# Patient Record
Sex: FEMALE | Race: WHITE | NOT HISPANIC OR LATINO | Employment: FULL TIME | URBAN - METROPOLITAN AREA
[De-identification: names, ages, dates, MRNs, and addresses within clinical notes are randomized per-mention and may not be internally consistent; named-entity substitution may affect disease eponyms.]

---

## 2017-04-14 ENCOUNTER — TRANSCRIBE ORDERS (OUTPATIENT)
Dept: ADMINISTRATIVE | Facility: HOSPITAL | Age: 37
End: 2017-04-14

## 2017-04-14 ENCOUNTER — HOSPITAL ENCOUNTER (OUTPATIENT)
Dept: GENERAL RADIOLOGY | Facility: HOSPITAL | Age: 37
Discharge: HOME | End: 2017-04-14
Admitting: NURSE PRACTITIONER

## 2017-04-14 DIAGNOSIS — N20.0 RECURRENT NEPHROLITHIASIS: ICD-10-CM

## 2017-04-14 DIAGNOSIS — N20.0 RECURRENT NEPHROLITHIASIS: Primary | ICD-10-CM

## 2017-04-14 PROCEDURE — 74000 HC ABDOMEN KUB: CPT

## 2017-07-25 ENCOUNTER — HOSPITAL ENCOUNTER (OUTPATIENT)
Dept: CT IMAGING | Facility: HOSPITAL | Age: 37
Discharge: HOME | End: 2017-07-25
Admitting: NURSE PRACTITIONER

## 2017-07-25 ENCOUNTER — TRANSCRIBE ORDERS (OUTPATIENT)
Dept: ADMINISTRATIVE | Facility: HOSPITAL | Age: 37
End: 2017-07-25

## 2017-07-25 DIAGNOSIS — R10.31 RLQ ABDOMINAL PAIN: Primary | ICD-10-CM

## 2017-07-25 DIAGNOSIS — R10.31 RLQ ABDOMINAL PAIN: ICD-10-CM

## 2017-07-25 PROCEDURE — 74176 CT ABD & PELVIS W/O CONTRAST: CPT

## 2017-07-25 PROCEDURE — 0 DIATRIZOATE MEGLUMINE & SODIUM PER 1 ML: Performed by: NURSE PRACTITIONER

## 2017-07-25 RX ADMIN — DIATRIZOATE MEGLUMINE AND DIATRIZOATE SODIUM 15 ML: 660; 100 LIQUID ORAL; RECTAL at 10:30

## 2020-11-30 ENCOUNTER — OFFICE VISIT (OUTPATIENT)
Dept: OBSTETRICS AND GYNECOLOGY | Facility: CLINIC | Age: 40
End: 2020-11-30

## 2020-11-30 VITALS
SYSTOLIC BLOOD PRESSURE: 120 MMHG | WEIGHT: 202.5 LBS | HEIGHT: 67 IN | DIASTOLIC BLOOD PRESSURE: 70 MMHG | BODY MASS INDEX: 31.78 KG/M2

## 2020-11-30 DIAGNOSIS — Z01.419 ENCOUNTER FOR ANNUAL ROUTINE GYNECOLOGICAL EXAMINATION: Primary | ICD-10-CM

## 2020-11-30 DIAGNOSIS — Z12.31 ENCOUNTER FOR SCREENING MAMMOGRAM FOR MALIGNANT NEOPLASM OF BREAST: ICD-10-CM

## 2020-11-30 PROCEDURE — 99395 PREV VISIT EST AGE 18-39: CPT | Performed by: NURSE PRACTITIONER

## 2020-11-30 RX ORDER — NORELGESTROMIN AND ETHINYL ESTRADIOL 150; 35 UG/D; UG/D
1 PATCH TRANSDERMAL WEEKLY
Qty: 3 PATCH | Refills: 12 | Status: SHIPPED | OUTPATIENT
Start: 2020-11-30

## 2020-11-30 RX ORDER — IBUPROFEN 800 MG/1
TABLET ORAL
COMMUNITY
Start: 2020-11-25 | End: 2021-07-06

## 2020-11-30 RX ORDER — NORELGESTROMIN AND ETHINYL ESTRADIOL 150; 35 UG/D; UG/D
PATCH TRANSDERMAL
COMMUNITY
Start: 2020-09-10 | End: 2020-11-30 | Stop reason: SDUPTHER

## 2020-11-30 RX ORDER — CYCLOBENZAPRINE HCL 5 MG
TABLET ORAL
COMMUNITY
Start: 2020-10-15 | End: 2021-08-18

## 2020-11-30 RX ORDER — HYDROCHLOROTHIAZIDE 25 MG/1
25 TABLET ORAL DAILY
COMMUNITY

## 2020-11-30 NOTE — PROGRESS NOTES
GYN Annual Exam     CC - Here for annual exam.        [facility]  Mica Jacobson is a 40 y.o. female, , who presents for annual well woman exam. Patient's last menstrual period was 2020 (within days)..  Periods are regular every 25-35 days, lasting 5 days. .  Dysmenorrhea:none.  Patient reports problems with: none.  There were no changes to her medical or surgical history since her last visit.. Partner Status: Marital Status: .  New Partners since last visit: no.  Desires STD Screening: no.    Additional OB/GYN History   Current contraception: contraceptive methods: xulane  Desires to: continue contraception  Last Pap : 2019  Last Completed Pap Smear       Status Date      PAP SMEAR Done 2019 negative        History of abnormal Pap smear: yes - hx of leep at age 20  Family history of uterine, colon, breast, or ovarian cancer: no  Performs monthly Self-Breast Exam: yes  Exercises Regularly:no  Feelings of Anxiety or Depression: no  Tobacco Usage?: No   OB History        1    Para   1    Term   1            AB        Living   1       SAB        TAB        Ectopic        Molar        Multiple        Live Births                    Health Maintenance   Topic Date Due   • Annual Gynecologic Pelvic and Breast Exam  1980   • ANNUAL PHYSICAL  1984   • TDAP/TD VACCINES (1 - Tdap) 2000   • INFLUENZA VACCINE  2020   • HEPATITIS C SCREENING  10/26/2020   • PAP SMEAR  2022   • Pneumococcal Vaccine 0-64  Aged Out       The additional following portions of the patient's history were reviewed and updated as appropriate: allergies, current medications, past family history, past medical history, past social history, past surgical history and problem list.    Review of Systems   Constitutional: Negative.    HENT: Negative.    Eyes: Negative.    Respiratory: Negative.    Cardiovascular: Negative.    Gastrointestinal: Negative.    Endocrine: Negative.   "  Genitourinary: Negative.    Musculoskeletal: Negative.    Skin: Negative.    Allergic/Immunologic: Negative.    Neurological: Negative.    Hematological: Negative.    Psychiatric/Behavioral: Negative.      All other systems reviewed and are negative.     I have reviewed and agree with the HPI, ROS, and historical information as entered above. Lynnette Sterling, APRN    Objective   /70   Ht 170.2 cm (67\")   Wt 91.9 kg (202 lb 8 oz)   LMP 11/04/2020 (Within Days)   Breastfeeding No   BMI 31.72 kg/m²     Physical Exam  Vitals signs and nursing note reviewed. Exam conducted with a chaperone present.   Constitutional:       Appearance: She is well-developed.   HENT:      Head: Normocephalic and atraumatic.   Neck:      Musculoskeletal: Normal range of motion. No muscular tenderness.      Thyroid: No thyroid mass or thyromegaly.   Cardiovascular:      Rate and Rhythm: Normal rate and regular rhythm.      Heart sounds: No murmur.   Pulmonary:      Effort: Pulmonary effort is normal. No retractions.      Breath sounds: Normal breath sounds. No wheezing, rhonchi or rales.   Chest:      Chest wall: No mass or tenderness.      Breasts:         Right: Normal. No mass, nipple discharge, skin change or tenderness.         Left: Normal. No mass, nipple discharge, skin change or tenderness.   Abdominal:      Palpations: Abdomen is soft. Abdomen is not rigid. There is no mass.      Tenderness: There is no abdominal tenderness. There is no guarding.      Hernia: No hernia is present. There is no hernia in the left inguinal area.   Genitourinary:     Labia:         Right: No rash, tenderness or lesion.         Left: No rash, tenderness or lesion.       Vagina: Normal. No vaginal discharge or lesions.      Cervix: No cervical motion tenderness, discharge, lesion or cervical bleeding.      Uterus: Normal. Not enlarged, not fixed and not tender.       Adnexa:         Right: No mass or tenderness.          Left: No mass or " tenderness.        Rectum: No external hemorrhoid.   Neurological:      Mental Status: She is alert and oriented to person, place, and time.   Psychiatric:         Behavior: Behavior normal.            Assessment and Plan    Problem List Items Addressed This Visit     None      Visit Diagnoses     Encounter for annual routine gynecological examination    -  Primary    Relevant Orders    Pap IG, HPV-hr          1. GYN annual well woman exam.   2. Reviewed risks/benefits of hormonal contraception after age 35, including possible increased risk of breast cancer, heart disease, blood clots and strokes.  Patient strongly desires to stay on hormonal contraception.  3. Reviewed monthly self breast exams.  Instructed to call with lumps, pain, or breast discharge.    4. RTC in 1 year or PRN with problems   5. Discussed xulane with increased risk of VTE, she VU and desires to continue.   6. She will schedule screening mammogram. Ordered today      Lynnette Sterling, APRN  11/30/2020

## 2020-12-03 DIAGNOSIS — Z01.419 ENCOUNTER FOR ANNUAL ROUTINE GYNECOLOGICAL EXAMINATION: ICD-10-CM

## 2021-06-09 ENCOUNTER — TRANSCRIBE ORDERS (OUTPATIENT)
Dept: ADMINISTRATIVE | Facility: HOSPITAL | Age: 41
End: 2021-06-09

## 2021-06-09 DIAGNOSIS — M51.36 DDD (DEGENERATIVE DISC DISEASE), LUMBAR: Primary | ICD-10-CM

## 2021-06-28 ENCOUNTER — HOSPITAL ENCOUNTER (OUTPATIENT)
Dept: MRI IMAGING | Facility: HOSPITAL | Age: 41
Discharge: HOME | End: 2021-06-28
Admitting: STUDENT IN AN ORGANIZED HEALTH CARE EDUCATION/TRAINING PROGRAM

## 2021-06-28 DIAGNOSIS — M51.36 DDD (DEGENERATIVE DISC DISEASE), LUMBAR: ICD-10-CM

## 2021-06-28 PROCEDURE — 72148 MRI LUMBAR SPINE W/O DYE: CPT

## 2021-07-06 RX ORDER — IBUPROFEN 800 MG/1
TABLET ORAL
Qty: 90 TABLET | Refills: 0 | Status: ON HOLD | OUTPATIENT
Start: 2021-07-06 | End: 2022-03-03 | Stop reason: SDUPTHER

## 2021-08-18 ENCOUNTER — OFFICE VISIT (OUTPATIENT)
Dept: NEUROSURGERY | Facility: CLINIC | Age: 41
End: 2021-08-18

## 2021-08-18 VITALS — HEIGHT: 66 IN | TEMPERATURE: 97.7 F | WEIGHT: 200 LBS | BODY MASS INDEX: 32.14 KG/M2

## 2021-08-18 DIAGNOSIS — M54.9 MECHANICAL BACK PAIN: ICD-10-CM

## 2021-08-18 DIAGNOSIS — M48.062 SPINAL STENOSIS OF LUMBAR REGION WITH NEUROGENIC CLAUDICATION: Primary | ICD-10-CM

## 2021-08-18 PROCEDURE — 99204 OFFICE O/P NEW MOD 45 MIN: CPT | Performed by: NEUROLOGICAL SURGERY

## 2021-08-18 RX ORDER — GABAPENTIN 300 MG/1
CAPSULE ORAL
Qty: 90 CAPSULE | Refills: 0 | Status: SHIPPED | OUTPATIENT
Start: 2021-08-18 | End: 2021-10-14

## 2021-08-18 RX ORDER — CARISOPRODOL 250 MG/1
1 TABLET ORAL NIGHTLY PRN
COMMUNITY
End: 2021-09-07

## 2021-08-18 NOTE — PROGRESS NOTES
Patient: Mica Jacobson  : 1980    Primary Care Provider: Brisa Griffith MD    Requesting Provider: As above        History    Chief Complaint: Low back and bilateral lower extremity pain.    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  On 2012 she underwent L4-S1 TLIF to address a discogenic pain syndrome.  She has done absolutely great.  However, for the last 6 months she has had increasing back and bilateral lower extremity symptoms.  These bother her when she is on her feet or with protracted sitting.  She even struggles at night when changing positions.  She has been treated with Soma.  She has no bowel or bladder dysfunction.    Review of Systems   Constitutional: Negative for activity change, appetite change, chills, diaphoresis, fatigue, fever and unexpected weight change.   HENT: Negative for congestion, dental problem, drooling, ear discharge, ear pain, facial swelling, hearing loss, mouth sores, nosebleeds, postnasal drip, rhinorrhea, sinus pressure, sinus pain, sneezing, sore throat, tinnitus, trouble swallowing and voice change.    Eyes: Negative for photophobia, pain, discharge, redness, itching and visual disturbance.   Respiratory: Negative for apnea, cough, choking, chest tightness, shortness of breath, wheezing and stridor.    Cardiovascular: Negative for chest pain, palpitations and leg swelling.   Gastrointestinal: Negative for abdominal distention, abdominal pain, anal bleeding, blood in stool, constipation, diarrhea, nausea, rectal pain and vomiting.   Endocrine: Negative for cold intolerance, heat intolerance, polydipsia, polyphagia and polyuria.   Genitourinary: Negative for decreased urine volume, difficulty urinating, dysuria, enuresis, flank pain, frequency, genital sores, hematuria and urgency.   Musculoskeletal: Positive for back pain. Negative for arthralgias, gait problem, joint  "swelling, myalgias, neck pain and neck stiffness.   Skin: Negative for color change, pallor, rash and wound.   Allergic/Immunologic: Negative for environmental allergies, food allergies and immunocompromised state.   Neurological: Positive for weakness. Negative for dizziness, tremors, seizures, syncope, facial asymmetry, speech difficulty, light-headedness, numbness and headaches.   Hematological: Negative for adenopathy. Does not bruise/bleed easily.   Psychiatric/Behavioral: Positive for sleep disturbance. Negative for agitation, behavioral problems, confusion, decreased concentration, dysphoric mood, hallucinations, self-injury and suicidal ideas. The patient is not nervous/anxious and is not hyperactive.    All other systems reviewed and are negative.      The patient's past medical history, past surgical history, family history, and social history have been reviewed at length in the electronic medical record.    Physical Exam:   Temp 97.7 °F (36.5 °C)   Ht 167.6 cm (65.98\")   Wt 90.7 kg (200 lb)   BMI 32.30 kg/m²   CONSTITUTIONAL: Patient is well-nourished, pleasant and appears stated age.  CV: Heart regular rate and rhythm without murmur, rub, or gallop.  PULMONARY: Lungs are clear to ascultation.  MUSCULOSKELETAL:  Straight leg raising is negative.  Reggie's Sign is negative.  ROM in the low back is normal.  Tenderness in the back to palpation is not observed.  NEUROLOGICAL:  Orientation, memory, attention span, language function, and cognition have been examined and are intact.  Strength is intact in the lower extremities to direct testing.  Muscle tone is normal throughout.  Station and gait are normal.  Sensation is intact to light touch testing throughout.  Deep tendon reflexes are 1+ and symmetrical.  Coordination is intact.      Medical Decision Making    Data Review:   (All imaging studies were personally reviewed unless stated otherwise)  MRI of the lumbar spine dated 6/20/2021 demonstrates " changes related to her prior L4-S1 decompression fusion.  There is generous stenosis at L3-4 above her previous fusion site.  The L3-4 disc appears radiographically normal.    Diagnosis:   1.  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  2.  Mechanical back pain.    Treatment Options:   I have referred the patient for physical therapy and have started her on gabapentin.  Prior to follow-up in several weeks I am going to check plain flexion-extension films as well as a CT of the lumbar spine to assess the integrity of her prior fusion construct.  We might consider injections if she is not doing better.       Diagnosis Plan   1. Spinal stenosis of lumbar region with neurogenic claudication  gabapentin (NEURONTIN) 300 MG capsule    Ambulatory Referral to Physical Therapy Evaluate and treat; Stretching, ROM, Strengthening   2. Mechanical back pain         Scribed for Ronald Cantu MD by TIAGO Sullivan 8/18/2021 17:05 EDT      I, Dr. Cantu, personally performed the services described in the documentation, as scribed in my presence, and it is both accurate and complete.

## 2021-09-01 ENCOUNTER — HOSPITAL ENCOUNTER (OUTPATIENT)
Dept: CT IMAGING | Facility: HOSPITAL | Age: 41
Discharge: HOME | End: 2021-09-01
Admitting: NEUROLOGICAL SURGERY

## 2021-09-01 ENCOUNTER — APPOINTMENT (OUTPATIENT)
Dept: GENERAL RADIOLOGY | Facility: HOSPITAL | Age: 41
End: 2021-09-01

## 2021-09-01 DIAGNOSIS — M48.062 SPINAL STENOSIS OF LUMBAR REGION WITH NEUROGENIC CLAUDICATION: ICD-10-CM

## 2021-09-01 PROCEDURE — 72131 CT LUMBAR SPINE W/O DYE: CPT

## 2021-09-07 ENCOUNTER — OFFICE VISIT (OUTPATIENT)
Dept: NEUROSURGERY | Facility: CLINIC | Age: 41
End: 2021-09-07

## 2021-09-07 VITALS — TEMPERATURE: 97.5 F | HEIGHT: 66 IN | BODY MASS INDEX: 32.14 KG/M2 | WEIGHT: 200 LBS

## 2021-09-07 DIAGNOSIS — M48.062 SPINAL STENOSIS OF LUMBAR REGION WITH NEUROGENIC CLAUDICATION: ICD-10-CM

## 2021-09-07 DIAGNOSIS — M54.9 MECHANICAL BACK PAIN: Primary | ICD-10-CM

## 2021-09-07 PROCEDURE — 99213 OFFICE O/P EST LOW 20 MIN: CPT | Performed by: NEUROLOGICAL SURGERY

## 2021-09-07 NOTE — PROGRESS NOTES
Patient: Mica Jacobson  : 1980    Primary Care Provider: Brisa Griffith MD    Requesting Provider: As above        History    Chief Complaint: Low back and bilateral lower extremity pain.    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  On 2012 she underwent L4-S1 TLIF to address a discogenic pain syndrome.  She has done absolutely great.  However, for the last 6 months she has had increasing back and bilateral lower extremity symptoms.  These bother her when she is on her feet or with protracted sitting.  She even struggles at night when changing positions.  She has been treated with Soma.  She has no bowel or bladder dysfunction.  Gabapentin has made her a bit groggy but may be helping modestly.  She did one physical therapy session and more was not approved by sherie.    Review of Systems   Constitutional: Negative for activity change, appetite change, chills, diaphoresis, fatigue, fever and unexpected weight change.   HENT: Negative for congestion, dental problem, drooling, ear discharge, ear pain, facial swelling, hearing loss, mouth sores, nosebleeds, postnasal drip, rhinorrhea, sinus pressure, sinus pain, sneezing, sore throat, tinnitus, trouble swallowing and voice change.    Eyes: Negative for photophobia, pain, discharge, redness, itching and visual disturbance.   Respiratory: Negative for apnea, cough, choking, chest tightness, shortness of breath, wheezing and stridor.    Cardiovascular: Negative for chest pain, palpitations and leg swelling.   Gastrointestinal: Negative for abdominal distention, abdominal pain, anal bleeding, blood in stool, constipation, diarrhea, nausea, rectal pain and vomiting.   Endocrine: Negative for cold intolerance, heat intolerance, polydipsia, polyphagia and polyuria.   Genitourinary: Negative for decreased urine volume, difficulty urinating, dysuria, enuresis, flank pain,  "frequency, genital sores, hematuria and urgency.   Musculoskeletal: Positive for back pain. Negative for arthralgias, gait problem, joint swelling, myalgias, neck pain and neck stiffness.   Skin: Negative for color change, pallor, rash and wound.   Allergic/Immunologic: Negative for environmental allergies, food allergies and immunocompromised state.   Neurological: Positive for weakness. Negative for dizziness, tremors, seizures, syncope, facial asymmetry, speech difficulty, light-headedness, numbness and headaches.   Hematological: Negative for adenopathy. Does not bruise/bleed easily.   Psychiatric/Behavioral: Positive for sleep disturbance. Negative for agitation, behavioral problems, confusion, decreased concentration, dysphoric mood, hallucinations, self-injury and suicidal ideas. The patient is not nervous/anxious and is not hyperactive.    All other systems reviewed and are negative.      The patient's past medical history, past surgical history, family history, and social history have been reviewed at length in the electronic medical record.    Physical Exam:   Temp 97.5 °F (36.4 °C)   Ht 167.6 cm (65.98\")   Wt 90.7 kg (200 lb)   BMI 32.30 kg/m²   MUSCULOSKELETAL:  Straight leg raising is negative.  Reggie's Sign is negative.  Tenderness in the back to palpation is not observed.  NEUROLOGICAL:  Strength is intact in the lower extremities to direct testing.  Muscle tone is normal throughout.  Station and gait are normal.  Sensation is intact to light touch testing throughout.      Medical Decision Making    Data Review:   (All imaging studies were personally reviewed unless stated otherwise)  MRI of the lumbar spine dated 6/20/2021 demonstrates changes related to her prior L4-S1 decompression fusion.  There is generous stenosis at L3-4 above her previous fusion site.  The L3-4 disc appears radiographically normal.    CT of the lumbar spine demonstrates solid fusion at L4-5 and L5-S1.    Flexion-extension " "films do not reveal evidence of instability above her previous fusion.  The radiologist commented on retrolisthesis of L2 on L3 and L3 on L4 with \"mild motion present.\"  I would disagree completely.    Diagnosis:   1.  Mechanical low back pain.  2.  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    Treatment Options:   The patient is going to give the gabapentin another couple of weeks.  If she is not tolerating the medicine then she will discontinue that.  I am going to refer her to another physical therapy setting.  I am not sure what her previous therapist at [facility] was trying to do or bill for.  I am also going to refer her to one of my pain colleagues for some injections.  She will follow-up thereafter.       Diagnosis Plan   1. Mechanical back pain     2. Spinal stenosis of lumbar region with neurogenic claudication  Ambulatory Referral to Pain Management    Ambulatory Referral to Physical Therapy Evaluate and treat; Stretching, ROM, Strengthening       Scribed for Ronald Cantu MD by TIAGO Sullivan 9/7/2021 15:05 EDT      I, Dr. Cantu, personally performed the services described in the documentation, as scribed in my presence, and it is both accurate and complete.  "

## 2021-09-08 ENCOUNTER — TELEPHONE (OUTPATIENT)
Dept: NEUROSURGERY | Facility: CLINIC | Age: 41
End: 2021-09-08

## 2021-09-08 NOTE — TELEPHONE ENCOUNTER
Patient should discuss pain management with PCP.  We do not prescribe narcotics etc. without patient being on schedule.    Patient is already taking gabapentin and ibuprofen.  Patient could try Tylenol in between for breakthrough pain

## 2021-09-08 NOTE — TELEPHONE ENCOUNTER
Provider:  Pepe  Caller: patient  Time of call:   10:59  Phone #: [phone]   Surgery:    Surgery Date:    Last visit:   yesterday  Next visit:     SIENNA:     06/08/2021 Carisoprodol 250MG 1980 15 15 ANITA DOMÍNGUEZ [facility] [address] [place] [address] 1  06/30/2021 Carisoprodol 250MG 1980 15 15 GIOVANNY BELLO [facility] [address] [place] [address] 1  08/18/2021 Gabapentin 300MG 1980 90 33 PEPE MEEK [facility] [address] [place] [address]    Reason for call:     Patient called and asked if she could have something for pain until she gets in to see Dr. Owusu?

## 2021-09-28 ENCOUNTER — TELEPHONE (OUTPATIENT)
Dept: PAIN MEDICINE | Facility: CLINIC | Age: 41
End: 2021-09-28

## 2021-09-28 NOTE — TELEPHONE ENCOUNTER
CIERRAM for pt regarding pain medication for her back. Advised that it is against our policy for Dr. Owusu to prescribe anything for pain. Advise pt to call back in any further questions.

## 2021-09-28 NOTE — TELEPHONE ENCOUNTER
Caller: VERONIKA KRISHNA    Relationship to patient: SELF    Best call back number:     Patient is needing: THE PATIENT IS CALLING TO SEE IF DR SUTHERLAND WOULD BE ABLE TO PRESCRIBE HER ANY PAIN MEDICATION FOR HER BACK TO HOLD HER OVER UNTIL HER NEW PATIENT APPOINTMENT ON 10/14/21. THE PATIENT STATED SHE HAD ASKED DR FERNÁNDEZ AND HIS OFFICE TOLD HER THEY WERE UNABLE TO PRESCRIBE HER ANY MEDICATION.

## 2021-10-04 NOTE — PROGRESS NOTES
"Chief Complaint: \"Pain in my lower back and legs\"        History of Present Illness:   Patient: Ms. Mica Jacobson, 41 y.o. female   Referring Physician: Ronald Cantu MD   Reason for Referral: Consultation for chronic intractable lower back and lower extremity pain.   Pain History: Patient reports a history of L4-S1 posterior lumbar interbody fusion on 08/27/2012 with excellent outcome.  Unfortunately, for the past 6-9 months, she has been experiencing increasing lower back pain and lower extremity pain associated with significant neurogenic claudication. She also experiences pain in her feet with protracted sitting.  Sometimes during the night she struggles with pain when changing positions.  Patient participated in physical therapy for a limited number of sessions due to lack of approval by her insurance (only allows 6 visits).  She experienced some relief from gabapentin but had to stop it due to side effects (drowsiness). MRI of the lumbar spine on 06/20/2021 revealed successful decompression and fusion L4-S1. Moderate to severe canal and foraminal stenosis at L3-L4, at the adjacent level of her lumbar fusion.  CT of the lumbar spine confirm a solid fusion L4-S1 without evidence of hardware complication.  Flexion and extension x-rays of the lumbar spine reviewed the solid fusion.  No evidence of significant instability. Patient has failed to obtain pain relief with conservative measures including oral analgesics, physical therapy, to name a few. Mica Jacobson underwent neurosurgical consultation with Dr. Ronald Cantu on 9/7/2021, and was found not to be a surgical candidate at this time.  Pain has progressed in intensity over the past several months.   Pain Description: Constant lower back pain with intermittent exacerbation, described as aching, burning, shooting and throbbing sensation.   Radiation of Pain: The pain radiates from the lumbar region into her buttocks, the medial aspect of her " thighs, and down the lateral aspect of her thighs, calves, and into the lateral aspect of her feet  Pain intensity today: 4/10  Average pain intensity last week: 5/10  Pain intensity ranges from: 4/10 to 9/10  Aggravating factors: Pain increases with bending, twisting, protracted sitting, standing, walking. Patient describes neurogenic claudication.    Alleviating factors: Pain decreases with lying down   Associated Symptoms:   Patient reports pain and weakness (claudication) in the lower extremities. Patient denies numbness  Patient denies any new bladder or bowel problems  Patient reports difficulties with her balance but denies falls  Pain interferes with her sleep causing sleep fragmentation    Review of previous therapies and additional medical records:  Mica Jacobson has already failed the following measures, including:   Conservative Measures: Oral analgesics, opioids, topical analgesics, ice, heat, TENs, physical therapy   Interventional Measures: None  Surgical Measures: History of L4-S1 posterior lumbar interbody fusion by Dr. Ronald Cantu in 8/2012   Mica Jacobson underwent neurosurgical consultation with Dr. Ronald Cantu on 9/7/2021, and was found not to be a surgical candidate at this time.  Mica Jacobson presents with significant comorbidities including GERD, kidney stones, mild obesity, current every day smoker  In terms of current analgesics, Mica Jacobson takes: ibuprofen, Flexeril, Norco  I have reviewed [facility] Report #[id number] (gabapentin) consistent with medication reconciliation.  SOAPP: Not completed by the patient    Global Pain Scale 10-14  2021          Pain 15          Feelings 5          Clinical outcomes 18          Activities 5          GPS Total: 43            Review of Diagnostic Studies:  I have reviewed the images with the patient as well as the reports, as follows;  CT scan of the lumbar spine without contrast on 09/03/2021.  Evidence of prior L4-S1  posterior lumbar fusion without evidence of complication of the hardware.  Alignment is anatomic.  Multilevel spondylosis particularly at the adjacent level of L3-L4.  MRI of the lumbar spine without contrast on 06/29/2021.  Vertebral body heights are maintained.  Prior fusion L4 L5-S1.  Alignment is preserved.  The conus medullaris and cauda equina image unremarkably.  Axial imaging:  L2-L3: Facet arthropathy.  No significant canal or foraminal stenosis  L3-L4: Disc bulge, facet arthropathy with moderate to severe spinal canal stenosis and moderate bilateral neuroforaminal stenosis  L4-L5 and L5-S1: S/p previous decompression and fusion without evidence of canal or foraminal stenosis  Flexion and extension x-rays of the lumbar spine on September 3, 2021.  Grade 1 listhesis of L2 on L3 and L3-L4 without abnormal motion.  Fusion hardware seen from L4-S1    Review of Systems   Constitutional: Positive for fatigue.   Musculoskeletal: Positive for back pain.   Neurological: Positive for weakness.   All other systems reviewed and are negative.        There is no problem list on file for this patient.      Past Medical History:   Diagnosis Date   • Abnormal Pap smear of cervix    • Back problem    • Breast cyst age 16    one breast is smaller   • Calculus of kidney h/o 2010 2016-passed 1 and still have 2-started QD water pill. She passed stone 11/25/19-brought in stone   • GERD (gastroesophageal reflux disease)    • Hx of degenerative disc disease    • Ovarian cyst     ruptured 2013, small present on CT 7/2016   • Papanicolaou smear 11/19/2018    reviewed 11/25/2019-negative   • Screening breast examination     self admits        Past Surgical History:   Procedure Laterality Date   • CARPAL TUNNEL RELEASE Bilateral 2018   • CHOLECYSTECTOMY     • COLPOSCOPY     • KIDNEY STONE SURGERY  2010   • OTHER SURGICAL HISTORY  age 20    laser of cervix   • SPINAL FUSION  08/27/2012    DDD   • WISDOM TOOTH EXTRACTION      2017     "     Family History   Problem Relation Age of Onset   • Heart disease Father         heart attacks   • Hypertension Father    • Diabetes Brother    • Hypertension Brother    • Diabetes Maternal Grandmother    • Diabetes Maternal Grandfather          Social History     Socioeconomic History   • Marital status:    Tobacco Use   • Smoking status: Current Every Day Smoker     Packs/day: 0.50     Types: Cigarettes     Last attempt to quit:      Years since quittin.7   • Smokeless tobacco: Never Used   • Tobacco comment: Currently trying to quit, 2021   Vaping Use   • Vaping Use: Never used   Substance and Sexual Activity   • Alcohol use: Yes     Comment: Rare, 1 or 2 drinks per week   • Drug use: Never   • Sexual activity: Defer     Birth control/protection: Patch           Current Outpatient Medications:   •  cyclobenzaprine (FLEXERIL) 5 MG tablet, Take 5 mg by mouth 3 (Three) Times a Day As Needed for Muscle Spasms., Disp: , Rfl:   •  hydroCHLOROthiazide (HYDRODIURIL) 25 MG tablet, Take 25 mg by mouth Daily., Disp: , Rfl:   •  HYDROcodone-acetaminophen (Norco) 5-325 MG per tablet, , Disp: , Rfl:   •  ibuprofen (ADVIL,MOTRIN) 800 MG tablet, TAKE ONE TABLET BY MOUTH THREE TIMES A DAY WITH FOOD FOR 30 DAYS (Patient taking differently: Take 800 mg by mouth 2 (two) times a day.), Disp: 90 tablet, Rfl: 0  •  Xulane 150-35 MCG/24HR, Place 1 patch on the skin as directed by provider 1 (One) Time Per Week., Disp: 3 patch, Rfl: 12      Allergies   Allergen Reactions   • Shellfish-Derived Products Anaphylaxis and Hives   • Tizanidine Other (See Comments)     Pt states severe dizziness, drowsiness, weakness, severe dry mouth, headache, nausea          /76 (BP Location: Left arm, Patient Position: Sitting, Cuff Size: Adult)   Pulse 94   Temp 95.5 °F (35.3 °C)   Ht 170.2 cm (67\")   Wt 92.8 kg (204 lb 9.6 oz)   SpO2 98%   BMI 32.04 kg/m²       Physical Exam:  Constitutional: Patient appears " well-developed, well-nourished, well-hydrated, appears younger than stated age  HEENT: Head: Normocephalic and atraumatic  Eyes: Conjunctivae and lids are normal  Pupils: Equal, round, reactive to light  Neck: Trachea normal. Neck supple. No JVD present.   Peripheral vascular exam: Posterior tibialis: right 2+ and left 2+. Dorsalis pedis: right 2+ and left 2+. No edema.   Musculoskeletal   Gait and station: Gait evaluation demonstrated a normal gait. Able to walk on heels, toes, tandem walking   Lumbar spine: Passive and active range of motion are limited secondary to pain. Extension, flexion, lateral flexion, rotation of the lumbar spine increased and reproduced pain. Lumbar facet joint loading maneuvers are positive.  Reggie test and Gaenslen's test are negative   Piriformis maneuvers are negative   Palpation of the bilateral greater trochanter, unrevealing   Examination of the Iliotibial band: unrevealing   Hip joints: The range of motion of the hip joints is almost full and without pain   Neurological:   Patient is alert and oriented to person, place, and time.   Speech: Normal.   Cortical function: Normal mental status.   Cranial nerves 2-12: intact.   Reflex Scores:  Right patellar: 2+  Left patellar: 2+  Right Achilles: 1+  Left Achilles: 1+  Motor strength: 5/5  Motor Tone: Normal .   Involuntary movements: None.   Superficial/Primitive Reflexes: Primitive reflexes were absent.   Right Camarena: Absent  Left Camarena: Absent  Right ankle clonus: Absent  Left ankle clonus: Absent   Babinsky: Absent  Long tract signs: Negative. Straight leg raising test: Negative. Femoral stretch sign: Negative.   Sensory exam: Intact to light touch, intact pain and temperature sensation, intact vibration sensation and normal proprioception.   Coordination: Normal finger to nose and heel to shin. Normal balance and negative Romberg's sign   Skin and subcutaneous tissue: Skin is warm and intact. No rash noted. No cyanosis.    Psychiatric: Judgment and insight: Normal. Recent and remote memory: Intact. Mood and affect: Normal.     ASSESSMENT:   1. Lumbar stenosis with neurogenic claudication    2. History of fusion of lumbar spine    3. Mild obesity    4. Current every day smoker    5. Encounter for smoking cessation counseling        PLAN/MEDICAL DECISION MAKING:  Patient reports a history of L4-S1 posterior lumbar interbody fusion on 08/27/2012 with excellent outcome. Unfortunately, for the past 6-9 months, she started experiencing increasing lower back pain and bilateral lower extremity pain associated with neurogenic claudication. She also struggles with pain when changing positions during the night. Patient has participated in physical therapy without significant improvement.  She experienced some pain relief from gabapentin but had to stop it due to side effects (drowsiness). MRI of the lumbar spine on 06/20/2021 revealed successful decompression and fusion L4-S1. Moderate to severe canal and foraminal stenosis at L3-L4, at the adjacent level of her lumbar fusion.  CT of the lumbar spine confirm a solid fusion L4-S1 without evidence of hardware complication.  Flexion and extension x-rays of the lumbar spine reviewed the solid fusion. No evidence of significant instability. Patient has failed to obtain pain relief with conservative measures including oral analgesics, physical therapy, to name a few. Mica Jacobson underwent neurosurgical consultation with Dr. Ronald Cantu on 9/7/2021, and was found not to be a surgical candidate at this time.  Pain has progressed in intensity over the past several months. I have reviewed all available patient's medical records as well as previous therapies as referenced above. I had a lengthy conversation with Ms. Mica Jacobson regarding her chronic pain condition and potential therapeutic options including risks, benefits, alternative therapies, to name a few. Therefore, I have  proposed the following plan:  1. Pharmacological measures: Reviewed and discussed; Patient takes ibuprofen, Flexeril, Norco. Patient has declined additional pharmacological measures. We could consider a trial with a low dose of Lyrica or Cymbalta  2. Interventional pain management measures: Patient will be scheduled for diagnostic and therapeutic bilateral L3-L4 transforaminal epidural steroid injections. We may repeat epidurals depending on patient's outcome.  We could also consider the possibility of diagnostic and therapeutic bilateral lumbar facet joint injections at L3-4 as she also presents with a significant mechanical component of her chronic pain.  Patient will follow-up with Dr. Cantu thereafter.  3. Long-term rehabilitation efforts:  A. The patient does not have a history of falls. I did complete a risk assessment for falls  B. Continue a comprehensive physical therapy program for core strengthening, gait and balance training and neurodynamics   C. Start an exercise program such as water therapy and swimming  D. Contrast therapy: Apply ice-packs for 15-20 minutes, followed by heating pads for 15-20 minutes to affected area   E. Patient's Body mass index is 32 kg/m². We discussed portion control and increasing exercise. Patient counseled on the importance of weight loss to help with overall health and pain control. Patient instructed to attempt weight loss.    F. Patient has been screened for tobacco use: Current tobacco user. Smoking Cessation: I have advised the patient at length regarding the long-term deleterious effects of smoking and have provided the patient strategies to facilitate smoking cessation. I spent approximately 4 minutes advising the patient. In addition, I have prescribed Nicotine patches 7 mg one patch daily for 4 weeks, then, discontinue. Patient has been instructed to cut down or stop as he starts nicotine replacement therapy  4. The patient has been instructed to contact my office  with any questions or difficulties. The patient understands the plan and agrees to proceed accordingly.      Patient Care Team:  Brisa Griffith MD as PCP - General (General Practice)     No orders of the defined types were placed in this encounter.        Future Appointments   Date Time Provider Department Center   12/1/2021  1:30 PM Storm Lopez MD [facility]         Ben Owusu MD     EMR Dragon/Transcription disclaimer:  Much of this encounter note is an electronic transcription of spoken language to printed text. Electronic transcription of spoken language may permit erroneous, or at times, nonsensical words or phrases to be inadvertently transcribed. Although I have reviewed the note for such errors, some may still exist.

## 2021-10-14 ENCOUNTER — OFFICE VISIT (OUTPATIENT)
Dept: PAIN MEDICINE | Facility: CLINIC | Age: 41
End: 2021-10-14

## 2021-10-14 VITALS
WEIGHT: 204.6 LBS | DIASTOLIC BLOOD PRESSURE: 76 MMHG | HEIGHT: 67 IN | HEART RATE: 94 BPM | SYSTOLIC BLOOD PRESSURE: 118 MMHG | OXYGEN SATURATION: 98 % | BODY MASS INDEX: 32.11 KG/M2 | TEMPERATURE: 95.5 F

## 2021-10-14 DIAGNOSIS — Z71.6 ENCOUNTER FOR SMOKING CESSATION COUNSELING: ICD-10-CM

## 2021-10-14 DIAGNOSIS — M48.062 LUMBAR STENOSIS WITH NEUROGENIC CLAUDICATION: ICD-10-CM

## 2021-10-14 DIAGNOSIS — F17.200 CURRENT EVERY DAY SMOKER: ICD-10-CM

## 2021-10-14 DIAGNOSIS — F17.200 CURRENT EVERY DAY SMOKER: Primary | ICD-10-CM

## 2021-10-14 DIAGNOSIS — E66.9 MILD OBESITY: ICD-10-CM

## 2021-10-14 DIAGNOSIS — Z98.1 HISTORY OF FUSION OF LUMBAR SPINE: ICD-10-CM

## 2021-10-14 PROCEDURE — 99406 BEHAV CHNG SMOKING 3-10 MIN: CPT | Performed by: ANESTHESIOLOGY

## 2021-10-14 PROCEDURE — 99204 OFFICE O/P NEW MOD 45 MIN: CPT | Performed by: ANESTHESIOLOGY

## 2021-10-14 RX ORDER — HYDROCODONE BITARTRATE AND ACETAMINOPHEN 5; 325 MG/1; MG/1
TABLET ORAL
COMMUNITY
Start: 2021-10-05 | End: 2022-01-04

## 2021-10-14 RX ORDER — CYCLOBENZAPRINE HCL 5 MG
5 TABLET ORAL EVERY 8 HOURS PRN
COMMUNITY

## 2021-10-15 ENCOUNTER — PATIENT ROUNDING (BHMG ONLY) (OUTPATIENT)
Dept: PAIN MEDICINE | Facility: CLINIC | Age: 41
End: 2021-10-15

## 2021-10-15 NOTE — PROGRESS NOTES
October 15, 2021    Hello, may I speak with Mica Jacobson?    My name is TEJA     I am  with [facility]  [address]  [address]  [facility] [address] [phone].    Before we get started may I verify your date of birth? 1980    I am calling to officially welcome you to our practice and ask about your recent visit. Is this a good time to talk? YES    Tell me about your visit with us. What things went well?  IT ALL WENT WELL       We're always looking for ways to make our patients' experiences even better. Do you have recommendations on ways we may improve? NOT THAT I CAN RECALL     Overall were you satisfied with your first visit to our practice?YEAH,  IT WAS GOOD       I appreciate you taking the time to speak with me today. Is there anything else I can do for you? NO, IM DOING OKAY THANKS      Thank you, and have a great day.

## 2021-11-01 ENCOUNTER — OUTSIDE FACILITY SERVICE (OUTPATIENT)
Dept: PAIN MEDICINE | Facility: CLINIC | Age: 41
End: 2021-11-01

## 2021-11-01 PROCEDURE — 64483 NJX AA&/STRD TFRM EPI L/S 1: CPT | Performed by: ANESTHESIOLOGY

## 2021-11-02 ENCOUNTER — TELEPHONE (OUTPATIENT)
Dept: PAIN MEDICINE | Facility: CLINIC | Age: 41
End: 2021-11-02

## 2021-11-02 NOTE — TELEPHONE ENCOUNTER
Spoke with pt regarding yesterday’s procedure with Dr. Owusu. Pt stated they are doing well, with a little bit of pain down her back, and into her buttocks/groin and legs.I advised to alternate tylenol/ibupprofen, and use contrast therapy.  Advised of f/u appointment. Pt understood. Reminder card in the mail.

## 2021-11-03 ENCOUNTER — LAB (OUTPATIENT)
Dept: LAB | Facility: HOSPITAL | Age: 41
End: 2021-11-03

## 2021-11-03 ENCOUNTER — TRANSCRIBE ORDERS (OUTPATIENT)
Dept: LAB | Facility: HOSPITAL | Age: 41
End: 2021-11-03

## 2021-11-03 DIAGNOSIS — Z01.818 OTHER SPECIFIED PRE-OPERATIVE EXAMINATION: Primary | ICD-10-CM

## 2021-11-03 DIAGNOSIS — Z01.818 OTHER SPECIFIED PRE-OPERATIVE EXAMINATION: ICD-10-CM

## 2021-11-03 LAB
ANION GAP SERPL CALCULATED.3IONS-SCNC: 8.7 MMOL/L (ref 5–15)
BUN SERPL-MCNC: 12 MG/DL (ref 6–20)
BUN/CREAT SERPL: 16.4 (ref 7–25)
CALCIUM SPEC-SCNC: 9.9 MG/DL (ref 8.6–10.5)
CHLORIDE SERPL-SCNC: 101 MMOL/L (ref 98–107)
CO2 SERPL-SCNC: 26.3 MMOL/L (ref 22–29)
CREAT SERPL-MCNC: 0.73 MG/DL (ref 0.57–1)
DEPRECATED RDW RBC AUTO: 38.1 FL (ref 37–54)
ERYTHROCYTE [DISTWIDTH] IN BLOOD BY AUTOMATED COUNT: 12.1 % (ref 12.3–15.4)
GFR SERPL CREATININE-BSD FRML MDRD: 88 ML/MIN/1.73
GLUCOSE SERPL-MCNC: 85 MG/DL (ref 65–99)
HBA1C MFR BLD: 5.2 % (ref 4.8–5.6)
HCT VFR BLD AUTO: 40.3 % (ref 34–46.6)
HGB BLD-MCNC: 13.8 G/DL (ref 12–15.9)
MCH RBC QN AUTO: 30.2 PG (ref 26.6–33)
MCHC RBC AUTO-ENTMCNC: 34.2 G/DL (ref 31.5–35.7)
MCV RBC AUTO: 88.2 FL (ref 79–97)
PLATELET # BLD AUTO: 210 10*3/MM3 (ref 140–450)
PMV BLD AUTO: 11.8 FL (ref 6–12)
POTASSIUM SERPL-SCNC: 4.7 MMOL/L (ref 3.5–5.2)
RBC # BLD AUTO: 4.57 10*6/MM3 (ref 3.77–5.28)
SODIUM SERPL-SCNC: 136 MMOL/L (ref 136–145)
WBC # BLD AUTO: 9.17 10*3/MM3 (ref 3.4–10.8)

## 2021-11-03 PROCEDURE — 83036 HEMOGLOBIN GLYCOSYLATED A1C: CPT

## 2021-11-03 PROCEDURE — 36415 COLL VENOUS BLD VENIPUNCTURE: CPT

## 2021-11-03 PROCEDURE — 85027 COMPLETE CBC AUTOMATED: CPT

## 2021-11-03 PROCEDURE — 80048 BASIC METABOLIC PNL TOTAL CA: CPT

## 2021-11-14 PROCEDURE — U0004 COV-19 TEST NON-CDC HGH THRU: HCPCS | Performed by: FAMILY MEDICINE

## 2021-11-17 RX ORDER — OXYCODONE HYDROCHLORIDE 5 MG/1
5 TABLET ORAL EVERY 4 HOURS PRN
Qty: 25 TABLET | Refills: 0 | Status: ON HOLD | OUTPATIENT
Start: 2021-11-17 | End: 2022-02-28

## 2021-11-29 ENCOUNTER — TELEPHONE (OUTPATIENT)
Dept: PAIN MEDICINE | Facility: CLINIC | Age: 41
End: 2021-11-29

## 2021-11-29 RX ORDER — DULOXETIN HYDROCHLORIDE 20 MG/1
20 CAPSULE, DELAYED RELEASE ORAL DAILY
Qty: 30 CAPSULE | Refills: 0 | Status: ON HOLD | OUTPATIENT
Start: 2021-11-29 | End: 2022-02-28

## 2021-11-29 NOTE — TELEPHONE ENCOUNTER
Provider: DR. Owusu  Caller: VERONIKA HILL  Relationship to Patient: SELF    Phone Number: [phone]  Reason for Call: NOT ANY BETTER FROM INJ AND HAS FOLL UP 12/9/2021 - WAS TOLD TO ADVISE IF ANY BETTER OR WORSE FROM THE INJ - REQ CALL BACK FOR CLINICAL ADVICE OF WHAT CAN DO BEFORE COMING IN 12/9/2021

## 2021-11-29 NOTE — TELEPHONE ENCOUNTER
Spoke with pt who stated that she is having excruciating pain when she sits, and is unable to sit for more than a few minutes. Pt states that standing/walking is okay, and laying down is okay it just hurts when she sits. Pt stated that she is doing physical therapy and she hasn't had an relief. Pt stated that from her injection to now the pain has increased. She stated that she had tennis elbow surgery a week and half ago so she is home resting. Pt stated that tylenol/ibuprofen doesn't help. Pt also stated that heat and ice isn't working either. I advised that I would send a message to Dr. Owusu to see what he recommends.       Spoke with pt and advised that Dr. Owusu sent a rx for duloxetine. Also advised that when she sees Daiana 12/9/21 they can discuss moving forward with injections, or other. Pt understood.

## 2021-11-30 ENCOUNTER — TELEPHONE (OUTPATIENT)
Dept: PAIN MEDICINE | Facility: CLINIC | Age: 41
End: 2021-11-30

## 2021-11-30 NOTE — TELEPHONE ENCOUNTER
Provider: DR. SUTHERLAND    Caller: PATIENT      Relationship to Patient: SELF    Pharmacy: [facility]- [phone]    Phone Number:  [phone]    Reason for Call:  PT. STATES THAT DR. SUTHERLAND STARTED HER YESTERDAY ON DULOXETINE -SHE IS HAVING SIDE EFFECTS- SEVERE HEADACHE, SEVERE NAUSEA, DIZZY SPELLS.   WOULD LIKE TO KNOW WHAT DR. SUTHERLAND ADVISES

## 2021-12-08 NOTE — PROGRESS NOTES
"Chief Complaint: \"I still have pain in my back and legs\"        History of Present Illness:   Patient: Ms. Mica Jacobson, 41 y.o. female originally referred by Dr. Cantu in consultation for chronic intractable lower back and lower extremity pain.  Patient has a history of L4-S1 posterior lumbar interbody fusion on 8/27/2012 with excellent outcome with Dr. Ronald Cnatu.  Unfortunately, over the past 6 to 9 months she has been experiencing increasing lower back and lower extremity pain with intolerance to standing and walking.  We last saw her on November 1, 2021, when she underwent diagnostic and therapeutic bilateral L3-L4 transforaminal epidural steroid injections, from which she reports experiencing no significant reduction in her pain or pain levels.  She has been participating in physical therapy without significant improvement.  She was prescribed nicotine patches for smoking cessation.  She called the office several weeks after her injection, reporting an increase in her pain and no relief, she was started on a trial of Cymbalta 20 mg, from which she experienced significant side effects and discontinued medication.  Unfortunately, she continues to harbor significant symptoms of lower back and leg pain.  Her symptoms remain unchanged from previous visit.  She returns today for post procedure follow-up and evaluation.  Pain Description: Constant pain with intermittent exacerbation, described as aching, burning, shooting and throbbing sensation.   Radiation of Pain: The pain radiates from the lumbar region into her buttocks, the medial aspect of her thighs, and down the lateral aspect of her thighs, calves, and into the lateral aspect of her feet  Pain intensity today: 6/10  Average pain intensity last week: 5/10  Pain intensity ranges from: 3/10 to 10/10  Aggravating factors: Pain increases with bending, twisting, protracted sitting, standing, walking.     Alleviating factors: Pain decreases with lying " down   Associated Symptoms:   Patient reports pain and weakness in the lower extremities. Patient denies numbness  Patient denies any new bladder or bowel problems  Patient reports difficulties with her balance but denies falls    Review of previous therapies and additional medical records:  Mica Jacobson has already failed the following measures, including:   Conservative Measures: Oral analgesics, opioids, topical analgesics, ice, heat, TENs, physical therapy   Interventional Measures: 11/01/2021: DxTx  bilateral L3-L4 transforaminal epidural steroid injections  Surgical Measures: History of L4-S1 posterior lumbar interbody fusion by Dr. Ronald Cantu in 8/2012   Mica Jacobson underwent neurosurgical consultation with Dr. Ronald Cantu on 9/7/2021, and was found not to be a surgical candidate at this time.  Mica Jacobson presents with significant comorbidities including GERD, kidney stones, mild obesity, current every day smoker  In terms of current analgesics, Mica Jacobson takes: ibuprofen, Flexeril, Norco.  She has failed trials with gabapentin and Cymbalta due to side effects.  I have reviewed Agapito Report is consistent with medication reconciliation.  SOAPP: Not completed by the patient    Global Pain Scale 10-14  2021 12-09 2021         Pain 15 15         Feelings 5 0         Clinical outcomes 18 20         Activities 5 8         GPS Total: 43 43           Review of Diagnostic Studies:   CT scan of the lumbar spine without contrast on 09/03/2021: Imaging was reviewed.  Evidence of prior L4-S1 posterior lumbar fusion without evidence of complication of the hardware.  Alignment is anatomic.  Multilevel spondylosis particularly at the adjacent level of L3-L4.  MRI of the lumbar spine without contrast on 06/29/2021: Imaging was reviewed.  Vertebral body heights are maintained.  Prior fusion L4 L5-S1.  Alignment is maintained.   L2-L3: Facet arthropathy.  No significant canal or  foraminal stenosis  L3-L4: Disc bulge, facet arthropathy with moderate to severe spinal canal stenosis and moderate bilateral neuroforaminal stenosis  L4-L5 and L5-S1: S/p previous decompression and fusion without evidence of canal or foraminal stenosis  Flexion and extension x-rays of the lumbar spine on September 3, 2021.  Grade 1 listhesis of L2 on L3 and L3-L4 without abnormal motion.  Fusion hardware seen from L4-S1    Review of Systems   Musculoskeletal: Positive for back pain.   All other systems reviewed and are negative.        There is no problem list on file for this patient.      Past Medical History:   Diagnosis Date   • Abnormal Pap smear of cervix    • Back problem    • Breast cyst age 16    one breast is smaller   • Calculus of kidney h/o 2010-passed 1 and still have 2-started QD water pill. She passed stone 20-brought in stone   • GERD (gastroesophageal reflux disease)    • Hx of degenerative disc disease    • Ovarian cyst     ruptured , small present on CT 2016   • Papanicolaou smear 2018    reviewed 2019-negative   • Screening breast examination     self admits        Past Surgical History:   Procedure Laterality Date   • CARPAL TUNNEL RELEASE Bilateral    • CHOLECYSTECTOMY     • COLPOSCOPY     • KIDNEY STONE SURGERY     • OTHER SURGICAL HISTORY  age 20    laser of cervix   • SPINAL FUSION  2012    DDD   • TENNIS ELBOW RELEASE  2021   • WISDOM TOOTH EXTRACTION               Family History   Problem Relation Age of Onset   • Heart disease Father         heart attacks   • Hypertension Father    • Diabetes Brother    • Hypertension Brother    • Diabetes Maternal Grandmother    • Diabetes Maternal Grandfather          Social History     Socioeconomic History   • Marital status:    Tobacco Use   • Smoking status: Current Every Day Smoker     Packs/day: 0.50     Types: Cigarettes     Last attempt to quit:      Years since quittin.9  "  • Smokeless tobacco: Never Used   • Tobacco comment: Currently trying to quit, 09/07/2021   Vaping Use   • Vaping Use: Never used   Substance and Sexual Activity   • Alcohol use: Yes     Comment: Rare, 1 or 2 drinks per week   • Drug use: Never   • Sexual activity: Defer     Birth control/protection: Patch           Current Outpatient Medications:   •  hydroCHLOROthiazide (HYDRODIURIL) 25 MG tablet, Take 25 mg by mouth Daily., Disp: , Rfl:   •  HYDROcodone-acetaminophen (Norco) 5-325 MG per tablet, , Disp: , Rfl:   •  ibuprofen (ADVIL,MOTRIN) 800 MG tablet, TAKE ONE TABLET BY MOUTH THREE TIMES A DAY WITH FOOD FOR 30 DAYS (Patient taking differently: Take 800 mg by mouth 2 (two) times a day.), Disp: 90 tablet, Rfl: 0  •  nicotine (NICODERM CQ) 7 MG/24HR patch, Place 1 patch on the skin as directed by provider Daily., Disp: 28 patch, Rfl: 0  •  oxyCODONE (ROXICODONE) 5 MG immediate release tablet, Take 1 tablet by mouth Every 4 (Four) Hours As Needed for Moderate Pain ., Disp: 25 tablet, Rfl: 0  •  Xulane 150-35 MCG/24HR, Place 1 patch on the skin as directed by provider 1 (One) Time Per Week., Disp: 3 patch, Rfl: 12  •  cyclobenzaprine (FLEXERIL) 5 MG tablet, Take 5 mg by mouth 3 (Three) Times a Day As Needed for Muscle Spasms., Disp: , Rfl:   •  DULoxetine (CYMBALTA) 20 MG capsule, Take 1 capsule by mouth Daily., Disp: 30 capsule, Rfl: 0      Allergies   Allergen Reactions   • Shellfish-Derived Products Anaphylaxis and Hives   • Tizanidine Other (See Comments)     Pt states severe dizziness, drowsiness, weakness, severe dry mouth, headache, nausea          Pulse 81   Temp 97.9 °F (36.6 °C) (Infrared)   Resp 14   Ht 170.2 cm (67\")   Wt 93.2 kg (205 lb 6.4 oz)   SpO2 98%   BMI 32.17 kg/m²       Physical Exam:  Constitutional: Patient appears well-developed, well-nourished, well-hydrated, appears younger than stated age  HEENT: Head: Normocephalic and atraumatic  Eyes: Conjunctivae and lids are normal  Pupils: " Equal, round, reactive to light  Neck: Trachea normal. Neck supple. No JVD present.   Peripheral vascular exam: Posterior tibialis: right 2+ and left 2+. Dorsalis pedis: right 2+ and left 2+. No edema.   Musculoskeletal   Gait and station: Gait evaluation demonstrated a normal gait.   Lumbar spine: Passive and active range of motion are limited secondary to pain. Extension, flexion, lateral flexion, rotation of the lumbar spine increased and reproduced pain. Lumbar facet joint loading maneuvers are positive.  Reggie test and Gaenslen's test are negative   Piriformis maneuvers are negative   Palpation of the bilateral greater trochanter, unrevealing   Examination of the Iliotibial band: unrevealing   Hip joints: The range of motion of the hip joints is almost full and without pain   Neurological:   Patient is alert and oriented to person, place, and time.   Speech: Normal.   Cortical function: Normal mental status.   Cranial nerves 2-12: intact.   Reflex Scores:  Right patellar: 2+  Left patellar: 2+  Right Achilles: 1+  Left Achilles: 1+  Motor strength: 5/5  Motor Tone: Normal .   Involuntary movements: None.   Superficial/Primitive Reflexes: Primitive reflexes were absent.   Right Camarena: Absent  Left Camarena: Absent  Right ankle clonus: Absent  Left ankle clonus: Absent   Babinsky: Absent  Long tract signs: Negative. Straight leg raising test: Negative. Femoral stretch sign: Negative.   Sensory exam: Intact to light touch, intact pain and temperature sensation, intact vibration sensation and normal proprioception.   Coordination: Normal finger to nose and heel to shin. Normal balance and negative Romberg's sign   Skin and subcutaneous tissue: Skin is warm and intact. No rash noted. No cyanosis.   Psychiatric: Judgment and insight: Normal. Recent and remote memory: Intact. Mood and affect: Normal.     I have reviewed the physical exam dated 10/14/2021. Upon examination of the patient today, there are no changes  noted.      ASSESSMENT:   1. Lumbar stenosis with neurogenic claudication    2. History of fusion of lumbar spine    3. Spondylosis of lumbar region without myelopathy or radiculopathy    4. Current every day smoker    5. Mild obesity        PLAN/MEDICAL DECISION MAKING:  Patient has a history of L4-S1 posterior lumbar interbody fusion on 8/27/2012 with excellent outcome with Dr. Ronald Cantu.  Unfortunately, over the past 6 to 9 months she has been experiencing increasing lower back and lower extremity pain with intolerance to standing and walking. Patient has continued to participate in physical therapy without significant improvement.  MRI of the lumbar spine on 06/20/2021 revealed successful decompression and fusion L4-S1. Moderate to severe canal and foraminal stenosis at L3-L4, at the adjacent level of her lumbar fusion.  CT of the lumbar spine confirm a solid fusion L4-S1 without evidence of hardware complication.  Flexion and extension x-rays of the lumbar spine reviewed the solid fusion. No evidence of significant instability. Patient has failed to obtain pain relief with conservative measures including oral analgesics, physical therapy, to name a few.  Unfortunately, she has now failed trials with Cymbalta and gabapentin due to side effects.  She continues to harbor a significant amount of lower back and leg pain.  Unfortunately, she reported no reduction of her symptoms with transforaminal epidural, therefore the likelihood of a another epidural to improve her symptoms will not be beneficial.  She is going to follow-up with Dr. Cantu.  I have reviewed all available patient's medical records as well as previous therapies as referenced above. I had a lengthy conversation with Ms. Mica Jacobson regarding her chronic pain condition and potential therapeutic options including risks, benefits, alternative therapies, to name a few. Therefore, I have proposed the following plan:  1. Pharmacological  measures: Reviewed and discussed; Patient takes ibuprofen, Flexeril, Norco. Patient has declined additional pharmacological measures.   2. Interventional pain management measures: None indicated this time.  Patient is going to follow-up with Dr. Cantu.  3. Long-term rehabilitation efforts:  A. The patient does not have a history of falls. I did complete a risk assessment for falls  B.  Patient will continue a comprehensive physical therapy program for core strengthening, gait and balance training and neurodynamics   C. Start an exercise program such as water therapy and swimming  D. Contrast therapy: Apply ice-packs for 15-20 minutes, followed by heating pads for 15-20 minutes to affected area   E. Patient's Body mass index is 32 kg/m². We discussed portion control and increasing exercise. Patient counseled on the importance of weight loss to help with overall health and pain control. Patient instructed to attempt weight loss.    4. The patient has been instructed to contact my office with any questions or difficulties. The patient understands the plan and agrees to proceed accordingly.      Patient Care Team:  Brisa Griffith MD as PCP - General (General Practice)     No orders of the defined types were placed in this encounter.        Future Appointments   Date Time Provider Department Center   1/4/2022  3:20 PM Ronald Cantu MD [facility]/Transcription disclaimer:  Much of this encounter note is an electronic transcription of spoken language to printed text. Electronic transcription of spoken language may permit erroneous, or at times, nonsensical words or phrases to be inadvertently transcribed. Although I have reviewed the note for such errors, some may still exist.

## 2021-12-09 ENCOUNTER — OFFICE VISIT (OUTPATIENT)
Dept: PAIN MEDICINE | Facility: CLINIC | Age: 41
End: 2021-12-09

## 2021-12-09 VITALS
HEART RATE: 81 BPM | HEIGHT: 67 IN | OXYGEN SATURATION: 98 % | TEMPERATURE: 97.9 F | BODY MASS INDEX: 32.24 KG/M2 | WEIGHT: 205.4 LBS | RESPIRATION RATE: 14 BRPM

## 2021-12-09 DIAGNOSIS — E66.9 MILD OBESITY: ICD-10-CM

## 2021-12-09 DIAGNOSIS — M47.816 SPONDYLOSIS OF LUMBAR REGION WITHOUT MYELOPATHY OR RADICULOPATHY: ICD-10-CM

## 2021-12-09 DIAGNOSIS — Z98.1 HISTORY OF FUSION OF LUMBAR SPINE: ICD-10-CM

## 2021-12-09 DIAGNOSIS — F17.200 CURRENT EVERY DAY SMOKER: ICD-10-CM

## 2021-12-09 DIAGNOSIS — M48.062 LUMBAR STENOSIS WITH NEUROGENIC CLAUDICATION: ICD-10-CM

## 2021-12-09 PROCEDURE — 99212 OFFICE O/P EST SF 10 MIN: CPT | Performed by: NURSE PRACTITIONER

## 2021-12-21 ENCOUNTER — TELEPHONE (OUTPATIENT)
Dept: PAIN MEDICINE | Facility: CLINIC | Age: 41
End: 2021-12-21

## 2021-12-21 NOTE — TELEPHONE ENCOUNTER
Hub staff attempted to follow warm transfer process and was unsuccessful     Caller: VERONIKA KRISHNA    Relationship to patient: SELF    Best call back number: [phone]    Patient is needing: WANTED TO SEE IF THERE WAS SOMETHING DR SUTHERLAND COULD PRESCRIBE TO HELP WITH HER BACK PAIN, SHE'S HAD INJECTIONS AND IS SEEING DR FERNÁNDEZ IN TWO WEEKS BUT NOTHING SEEMS TO BE HELPING, INCLUDING HER CURRENT MEDICATIONS. PLEASE CALL HER BACK AT THE NUMBER ABOVE.

## 2021-12-22 NOTE — TELEPHONE ENCOUNTER
Spoke with pt and advised that Dr. Owusu recommends tylenol/ibuprofen and that she may want to reach out to [facility], or if the symptoms worsen to go to the ER or [facility]. Pt stated they see Dr. Menon 1/4/22 and will see what he says. Pt understood the above and no further needs expressed.

## 2021-12-23 ENCOUNTER — TELEPHONE (OUTPATIENT)
Dept: NEUROSURGERY | Facility: CLINIC | Age: 41
End: 2021-12-23

## 2021-12-23 NOTE — TELEPHONE ENCOUNTER
I called and spoke with patient and gave her the message per Monica. She said ok and was thankful for the call back.

## 2021-12-23 NOTE — TELEPHONE ENCOUNTER
Provider:  Pepe  Caller: patient  Time of call: 10:54    Phone #:  [phone]  Surgery:  L4-S1 TLIF  Surgery Date:  08/27/12  Last visit:   09/07/21  Next visit: 01/04/22    SIENNA:    06/08/2021 Carisoprodol 250MG 1980 15 15 ANITA DOMÍNGUEZ [facility] [address] [facility] 1  06/30/2021 Carisoprodol 250MG 1980 15 15 GIOVANNY BELLO [facility] [address] [place] [address] 1  08/18/2021 Gabapentin 300MG 1980 90 33 PEPE MEEK [facility] [address] [place] [address] 1  10/05/2021 Hydrocodone Bitartrate/Ac 325MG/5MG 1980 20 10 CRISTIN NASIR [facility] [address] [place] [address] 10 1  11/17/2021 Oxycodone Hydrochloride 5MG 1980 25 4 BECKA LAST [facility] [address] [place] [address] 47 1     Reason for call:     Patient called and asked if she could have something for pain until her apt with Dr. Cantu.    She said she was seeing Dr. Owusu, however injections are not working anymore.  She complains of low back pain and buttock pain.  She has pain that radiates down the sides of her lower extremities to her ankles. She is no longer taking Cymbalta. (She only took one dose.)  She has tried heat and ice with no relief.  She alternates Motrin and Tylenol.    Patient states Roxicodone was given to her last month as she had arm surgery.

## 2022-01-04 ENCOUNTER — OFFICE VISIT (OUTPATIENT)
Dept: NEUROSURGERY | Facility: CLINIC | Age: 42
End: 2022-01-04

## 2022-01-04 ENCOUNTER — PREP FOR SURGERY (OUTPATIENT)
Dept: OTHER | Facility: HOSPITAL | Age: 42
End: 2022-01-04

## 2022-01-04 VITALS — HEIGHT: 67 IN | WEIGHT: 205.4 LBS | TEMPERATURE: 98.4 F | BODY MASS INDEX: 32.24 KG/M2

## 2022-01-04 DIAGNOSIS — M48.062 LUMBAR STENOSIS WITH NEUROGENIC CLAUDICATION: Primary | ICD-10-CM

## 2022-01-04 DIAGNOSIS — M48.062 SPINAL STENOSIS OF LUMBAR REGION WITH NEUROGENIC CLAUDICATION: ICD-10-CM

## 2022-01-04 DIAGNOSIS — M54.9 MECHANICAL BACK PAIN: Primary | ICD-10-CM

## 2022-01-04 PROBLEM — N20.0 KIDNEY STONE: Status: ACTIVE | Noted: 2017-12-29

## 2022-01-04 PROCEDURE — 99213 OFFICE O/P EST LOW 20 MIN: CPT | Performed by: NEUROLOGICAL SURGERY

## 2022-01-04 RX ORDER — OXYCODONE HCL 10 MG/1
10 TABLET, FILM COATED, EXTENDED RELEASE ORAL ONCE
Status: CANCELLED | OUTPATIENT
Start: 2022-01-04 | End: 2022-01-04

## 2022-01-04 RX ORDER — IBUPROFEN 800 MG/1
800 TABLET ORAL ONCE
Status: CANCELLED | OUTPATIENT
Start: 2022-01-04 | End: 2022-01-04

## 2022-01-04 RX ORDER — ACETAMINOPHEN 500 MG
1000 TABLET ORAL ONCE
Status: CANCELLED | OUTPATIENT
Start: 2022-01-04 | End: 2022-01-04

## 2022-01-04 RX ORDER — CEFAZOLIN SODIUM 2 G/100ML
2 INJECTION, SOLUTION INTRAVENOUS ONCE
Status: CANCELLED | OUTPATIENT
Start: 2022-01-04 | End: 2022-01-04

## 2022-01-04 RX ORDER — FAMOTIDINE 20 MG/1
20 TABLET, FILM COATED ORAL
Status: CANCELLED | OUTPATIENT
Start: 2022-01-04

## 2022-01-04 NOTE — PROGRESS NOTES
Patient: Mica Jacobson  : 1980    Primary Care Provider: Brisa Griffith MD    Requesting Provider: As above        History    Chief Complaint: Low back and bilateral lower extremity pain.    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  On 2012 she underwent L4-S1 TLIF to address a discogenic pain syndrome.  She has done absolutely great.  However, for the last 10 months she has had increasing back and bilateral lower extremity symptoms.  These bother her when she is on her feet or with protracted sitting.  She even struggles at night when changing positions.  She has been treated with Soma.  She has no bowel or bladder dysfunction.  Gabapentin has made her a bit groggy but may be helping modestly.  Studies have demonstrated a transition syndrome with stenosis at L3-4 above her prior construct.  She has done more therapy to no avail.  An epidural injection was not helpful.  She is struggling.    Review of Systems   Constitutional: Negative for activity change, appetite change, chills, diaphoresis, fatigue, fever and unexpected weight change.   HENT: Negative for congestion, dental problem, drooling, ear discharge, ear pain, facial swelling, hearing loss, mouth sores, nosebleeds, postnasal drip, rhinorrhea, sinus pressure, sinus pain, sneezing, sore throat, tinnitus, trouble swallowing and voice change.    Eyes: Negative for photophobia, pain, discharge, redness, itching and visual disturbance.   Respiratory: Negative for apnea, cough, choking, chest tightness, shortness of breath, wheezing and stridor.    Cardiovascular: Negative for chest pain, palpitations and leg swelling.   Gastrointestinal: Negative for abdominal distention, abdominal pain, anal bleeding, blood in stool, constipation, diarrhea, nausea, rectal pain and vomiting.   Endocrine: Negative for cold intolerance, heat intolerance, polydipsia, polyphagia  "and polyuria.   Genitourinary: Negative for decreased urine volume, difficulty urinating, dysuria, enuresis, flank pain, frequency, genital sores, hematuria and urgency.   Musculoskeletal: Positive for back pain. Negative for gait problem, joint swelling, myalgias, neck pain and neck stiffness.   Skin: Negative for color change, pallor, rash and wound.   Allergic/Immunologic: Negative for environmental allergies, food allergies and immunocompromised state.   Neurological: Positive for numbness. Negative for dizziness, tremors, seizures, syncope, facial asymmetry, speech difficulty, weakness, light-headedness and headaches.   Hematological: Negative for adenopathy. Does not bruise/bleed easily.   Psychiatric/Behavioral: Negative for agitation, behavioral problems, confusion, decreased concentration, dysphoric mood, hallucinations, self-injury, sleep disturbance and suicidal ideas. The patient is not nervous/anxious and is not hyperactive.        The patient's past medical history, past surgical history, family history, and social history have been reviewed at length in the electronic medical record.    Physical Exam:   Temp 98.4 °F (36.9 °C)   Ht 170.2 cm (67\")   Wt 93.2 kg (205 lb 6.4 oz)   BMI 32.17 kg/m²   MUSCULOSKELETAL:  Straight leg raising is negative.  Reggie's Sign is negative.  Tenderness in the back to palpation is not observed.  NEUROLOGICAL:  Strength is intact in the lower extremities to direct testing.  Muscle tone is normal throughout.  Station and gait are normal.  Sensation is intact to light touch testing throughout.    Medical Decision Making    Data Review:   (All imaging studies were personally reviewed unless stated otherwise)  MRI of the lumbar spine dated 6/20/2021 demonstrates changes related to her prior L4-S1 decompression fusion.  There is generous stenosis at L3-4 above her previous fusion site.  The L3-4 disc appears radiographically normal.     CT of the lumbar spine demonstrates " "solid fusion at L4-5 and L5-S1.     Flexion-extension films do not reveal evidence of instability above her previous fusion.  The radiologist commented on retrolisthesis of L2 on L3 and L3 on L4 with \"mild motion present.\"  I would disagree completely.    Diagnosis:   1.  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  2.  Mechanical low back pain.    Treatment Options:   Patient is struggling.  She has exhausted other measures.  At this point I have recommended additional decompression with fusion extension to the L3-4 level.  We should be able to remove old hardware below those levels.  The nature of the procedure as well as the potential risks, complications, limitations, and alternatives to the procedure were discussed at length with the patient and the patient has agreed to proceed with surgery.       Diagnosis Plan   1. Mechanical back pain     2. Spinal stenosis of lumbar region with neurogenic claudication         Scribed for Ronald Cantu MD by Michelle Boyd CMA on 1/4/2022 16:17 EST       I, Dr. Cantu, personally performed the services described in the documentation, as scribed in my presence, and it is both accurate and complete.  "

## 2022-01-04 NOTE — H&P
Patient: Mica Jacobson  : 1980     Primary Care Provider: Brisa Griffith MD     Requesting Provider: As above           History     Chief Complaint: Low back and bilateral lower extremity pain.     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  On 2012 she underwent L4-S1 TLIF to address a discogenic pain syndrome.  She has done absolutely great.  However, for the last 10 months she has had increasing back and bilateral lower extremity symptoms.  These bother her when she is on her feet or with protracted sitting.  She even struggles at night when changing positions.  She has been treated with Soma.  She has no bowel or bladder dysfunction.  Gabapentin has made her a bit groggy but may be helping modestly.  Studies have demonstrated a transition syndrome with stenosis at L3-4 above her prior construct.  She has done more therapy to no avail.  An epidural injection was not helpful.  She is struggling.     Review of Systems   Constitutional: Negative for activity change, appetite change, chills, diaphoresis, fatigue, fever and unexpected weight change.   HENT: Negative for congestion, dental problem, drooling, ear discharge, ear pain, facial swelling, hearing loss, mouth sores, nosebleeds, postnasal drip, rhinorrhea, sinus pressure, sinus pain, sneezing, sore throat, tinnitus, trouble swallowing and voice change.    Eyes: Negative for photophobia, pain, discharge, redness, itching and visual disturbance.   Respiratory: Negative for apnea, cough, choking, chest tightness, shortness of breath, wheezing and stridor.    Cardiovascular: Negative for chest pain, palpitations and leg swelling.   Gastrointestinal: Negative for abdominal distention, abdominal pain, anal bleeding, blood in stool, constipation, diarrhea, nausea, rectal pain and vomiting.   Endocrine: Negative for cold intolerance, heat intolerance, polydipsia,  polyphagia and polyuria.   Genitourinary: Negative for decreased urine volume, difficulty urinating, dysuria, enuresis, flank pain, frequency, genital sores, hematuria and urgency.   Musculoskeletal: Positive for back pain. Negative for gait problem, joint swelling, myalgias, neck pain and neck stiffness.   Skin: Negative for color change, pallor, rash and wound.   Allergic/Immunologic: Negative for environmental allergies, food allergies and immunocompromised state.   Neurological: Positive for numbness. Negative for dizziness, tremors, seizures, syncope, facial asymmetry, speech difficulty, weakness, light-headedness and headaches.   Hematological: Negative for adenopathy. Does not bruise/bleed easily.   Psychiatric/Behavioral: Negative for agitation, behavioral problems, confusion, decreased concentration, dysphoric mood, hallucinations, self-injury, sleep disturbance and suicidal ideas. The patient is not nervous/anxious and is not hyperactive.          The patient's past medical history, past surgical history, family history, and social history have been reviewed at length in the electronic medical record.     Past Medical History:   Diagnosis Date   • Abnormal Pap smear of cervix    • Back problem    • Breast cyst age 16    one breast is smaller   • Calculus of kidney h/o 2010 2016-passed 1 and still have 2-started QD water pill. She passed stone 11/25/19-brought in stone   • GERD (gastroesophageal reflux disease)    • Hx of degenerative disc disease    • Ovarian cyst     ruptured 2013, small present on CT 7/2016   • Papanicolaou smear 11/19/2018    reviewed 11/25/2019-negative   • Screening breast examination     self admits     Past Surgical History:   Procedure Laterality Date   • CARPAL TUNNEL RELEASE Bilateral 2018   • CHOLECYSTECTOMY     • COLPOSCOPY     • KIDNEY STONE SURGERY  2010   • OTHER SURGICAL HISTORY  age 20    laser of cervix   • SPINAL FUSION  08/27/2012    DDD   • TENNIS ELBOW RELEASE   2021   • WISDOM TOOTH EXTRACTION      2017     Family History   Problem Relation Age of Onset   • Heart disease Father         heart attacks   • Hypertension Father    • Diabetes Brother    • Hypertension Brother    • Diabetes Maternal Grandmother    • Diabetes Maternal Grandfather      Social History     Socioeconomic History   • Marital status:    Tobacco Use   • Smoking status: Current Every Day Smoker     Packs/day: 0.50     Types: Cigarettes     Last attempt to quit:      Years since quittin.0   • Smokeless tobacco: Never Used   • Tobacco comment: Currently trying to quit, 2021   Vaping Use   • Vaping Use: Never used   Substance and Sexual Activity   • Alcohol use: Yes     Comment: Rare, 1 or 2 drinks per week   • Drug use: Never   • Sexual activity: Defer     Birth control/protection: Patch       Allergies   Allergen Reactions   • Shellfish-Derived Products Anaphylaxis and Hives   • Tizanidine Other (See Comments)     Pt states severe dizziness, drowsiness, weakness, severe dry mouth, headache, nausea    • Cymbalta [Duloxetine Hcl] Dizziness     Dry mouth weakness, headache, nausea, and drowiness       Current Outpatient Medications on File Prior to Visit   Medication Sig Dispense Refill   • cyclobenzaprine (FLEXERIL) 5 MG tablet Take 5 mg by mouth 3 (Three) Times a Day As Needed for Muscle Spasms.     • DULoxetine (CYMBALTA) 20 MG capsule Take 1 capsule by mouth Daily. 30 capsule 0   • hydroCHLOROthiazide (HYDRODIURIL) 25 MG tablet Take 25 mg by mouth Daily.     • ibuprofen (ADVIL,MOTRIN) 800 MG tablet TAKE ONE TABLET BY MOUTH THREE TIMES A DAY WITH FOOD FOR 30 DAYS (Patient taking differently: Take 800 mg by mouth 2 (two) times a day.) 90 tablet 0   • nicotine (NICODERM CQ) 7 MG/24HR patch Place 1 patch on the skin as directed by provider Daily. 28 patch 0   • oxyCODONE (ROXICODONE) 5 MG immediate release tablet Take 1 tablet by mouth Every 4 (Four) Hours As Needed for Moderate  "Pain . 25 tablet 0   • Xulane 150-35 MCG/24HR Place 1 patch on the skin as directed by provider 1 (One) Time Per Week. 3 patch 12   • [DISCONTINUED] HYDROcodone-acetaminophen (Norco) 5-325 MG per tablet        No current facility-administered medications on file prior to visit.        Physical Exam:   Temp 98.4 °F (36.9 °C)   Ht 170.2 cm (67\")   Wt 93.2 kg (205 lb 6.4 oz)   BMI 32.17 kg/m²   MUSCULOSKELETAL:  Straight leg raising is negative.  Reggie's Sign is negative.  Tenderness in the back to palpation is not observed.  NEUROLOGICAL:  Strength is intact in the lower extremities to direct testing.  Muscle tone is normal throughout.  Station and gait are normal.  Sensation is intact to light touch testing throughout.     Medical Decision Making     Data Review:   (All imaging studies were personally reviewed unless stated otherwise)  MRI of the lumbar spine dated 6/20/2021 demonstrates changes related to her prior L4-S1 decompression fusion.  There is generous stenosis at L3-4 above her previous fusion site.  The L3-4 disc appears radiographically normal.     CT of the lumbar spine demonstrates solid fusion at L4-5 and L5-S1.     Flexion-extension films do not reveal evidence of instability above her previous fusion.  The radiologist commented on retrolisthesis of L2 on L3 and L3 on L4 with \"mild motion present.\"  I would disagree completely.     Diagnosis:   1.  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  2.  Mechanical low back pain.     Treatment Options:   Patient is struggling.  She has exhausted other measures.  At this point I have recommended additional decompression with fusion extension to the L3-4 level.  We should be able to remove old hardware below those levels.  The nature of the procedure as well as the potential risks, complications, limitations, and alternatives to the procedure were discussed at length with the patient and the patient has agreed to proceed with surgery.          " Diagnosis Plan   1. Mechanical back pain      2. Spinal stenosis of lumbar region with neurogenic claudication

## 2022-01-24 ENCOUNTER — TELEPHONE (OUTPATIENT)
Dept: NEUROSURGERY | Facility: CLINIC | Age: 42
End: 2022-01-24

## 2022-01-24 NOTE — TELEPHONE ENCOUNTER
I explained to the patient that the hospital still has covid restrictions in place, and I'll call her as soon we can schedule her fusion surgery.

## 2022-01-24 NOTE — TELEPHONE ENCOUNTER
Provider: DR FERNÁNDEZ    Caller: VERONIKA KRISHNA    Relationship to Patient: SELF    Phone Number: [phone]    Reason for Call: PT IS CALLING TO SEE WHEN SHE CAN HAVE HER SURGERY

## 2022-02-25 ENCOUNTER — PRE-ADMISSION TESTING (OUTPATIENT)
Dept: PREADMISSION TESTING | Facility: HOSPITAL | Age: 42
End: 2022-02-25

## 2022-02-25 VITALS — BODY MASS INDEX: 32.67 KG/M2 | HEIGHT: 66 IN | WEIGHT: 203.26 LBS

## 2022-02-25 DIAGNOSIS — M48.062 LUMBAR STENOSIS WITH NEUROGENIC CLAUDICATION: ICD-10-CM

## 2022-02-25 LAB
DEPRECATED RDW RBC AUTO: 41 FL (ref 37–54)
ERYTHROCYTE [DISTWIDTH] IN BLOOD BY AUTOMATED COUNT: 12.3 % (ref 12.3–15.4)
HCT VFR BLD AUTO: 40 % (ref 34–46.6)
HGB BLD-MCNC: 13.3 G/DL (ref 12–15.9)
MCH RBC QN AUTO: 30.3 PG (ref 26.6–33)
MCHC RBC AUTO-ENTMCNC: 33.3 G/DL (ref 31.5–35.7)
MCV RBC AUTO: 91.1 FL (ref 79–97)
PLATELET # BLD AUTO: 221 10*3/MM3 (ref 140–450)
PMV BLD AUTO: 11.2 FL (ref 6–12)
POTASSIUM SERPL-SCNC: 4 MMOL/L (ref 3.5–5.2)
QT INTERVAL: 390 MS
QTC INTERVAL: 441 MS
RBC # BLD AUTO: 4.39 10*6/MM3 (ref 3.77–5.28)
SARS-COV-2 RNA PNL SPEC NAA+PROBE: NOT DETECTED
WBC NRBC COR # BLD: 8.84 10*3/MM3 (ref 3.4–10.8)

## 2022-02-25 PROCEDURE — U0004 COV-19 TEST NON-CDC HGH THRU: HCPCS

## 2022-02-25 PROCEDURE — 84132 ASSAY OF SERUM POTASSIUM: CPT

## 2022-02-25 PROCEDURE — 93005 ELECTROCARDIOGRAM TRACING: CPT

## 2022-02-25 PROCEDURE — 93010 ELECTROCARDIOGRAM REPORT: CPT | Performed by: INTERNAL MEDICINE

## 2022-02-25 PROCEDURE — 36415 COLL VENOUS BLD VENIPUNCTURE: CPT

## 2022-02-25 PROCEDURE — C9803 HOPD COVID-19 SPEC COLLECT: HCPCS

## 2022-02-25 PROCEDURE — 85027 COMPLETE CBC AUTOMATED: CPT

## 2022-02-25 PROCEDURE — 87081 CULTURE SCREEN ONLY: CPT

## 2022-02-25 RX ORDER — HYDROCODONE BITARTRATE AND ACETAMINOPHEN 5; 325 MG/1; MG/1
1 TABLET ORAL NIGHTLY
COMMUNITY
End: 2022-03-03 | Stop reason: HOSPADM

## 2022-02-26 LAB — MRSA SPEC QL CULT: NORMAL

## 2022-02-27 ENCOUNTER — ANESTHESIA EVENT (OUTPATIENT)
Dept: PERIOP | Facility: HOSPITAL | Age: 42
End: 2022-02-27

## 2022-02-28 ENCOUNTER — APPOINTMENT (OUTPATIENT)
Dept: GENERAL RADIOLOGY | Facility: HOSPITAL | Age: 42
End: 2022-02-28

## 2022-02-28 ENCOUNTER — ANESTHESIA (OUTPATIENT)
Dept: PERIOP | Facility: HOSPITAL | Age: 42
End: 2022-02-28

## 2022-02-28 ENCOUNTER — HOSPITAL ENCOUNTER (OUTPATIENT)
Facility: HOSPITAL | Age: 42
Discharge: HOME | End: 2022-03-03
Attending: NEUROLOGICAL SURGERY | Admitting: NEUROLOGICAL SURGERY

## 2022-02-28 DIAGNOSIS — M48.062 LUMBAR STENOSIS WITH NEUROGENIC CLAUDICATION: ICD-10-CM

## 2022-02-28 LAB
B-HCG UR QL: NEGATIVE
EXPIRATION DATE: NORMAL
INTERNAL NEGATIVE CONTROL: NEGATIVE
INTERNAL POSITIVE CONTROL: POSITIVE
Lab: NORMAL

## 2022-02-28 PROCEDURE — 25010000002 FENTANYL CITRATE (PF) 50 MCG/ML SOLUTION: Performed by: NURSE ANESTHETIST, CERTIFIED REGISTERED

## 2022-02-28 PROCEDURE — C1713 ANCHOR/SCREW BN/BN,TIS/BN: HCPCS | Performed by: NEUROLOGICAL SURGERY

## 2022-02-28 PROCEDURE — C1889 IMPLANT/INSERT DEVICE, NOC: HCPCS | Performed by: NEUROLOGICAL SURGERY

## 2022-02-28 PROCEDURE — 22630 ARTHRD PST TQ 1NTRSPC LUM: CPT | Performed by: NEUROLOGICAL SURGERY

## 2022-02-28 PROCEDURE — 63052 LAM FACETC/FRMT ARTHRD LUM 1: CPT | Performed by: NEUROLOGICAL SURGERY

## 2022-02-28 PROCEDURE — 25010000002 HYDROMORPHONE 1 MG/ML SOLUTION

## 2022-02-28 PROCEDURE — 22840 INSERT SPINE FIXATION DEVICE: CPT | Performed by: NEUROLOGICAL SURGERY

## 2022-02-28 PROCEDURE — 61783 SCAN PROC SPINAL: CPT | Performed by: NEUROLOGICAL SURGERY

## 2022-02-28 PROCEDURE — 25010000002 CEFAZOLIN IN DEXTROSE 2-4 GM/100ML-% SOLUTION: Performed by: NEUROLOGICAL SURGERY

## 2022-02-28 PROCEDURE — 25010000002 MORPHINE PER 10 MG: Performed by: NEUROLOGICAL SURGERY

## 2022-02-28 PROCEDURE — 25010000002 PROPOFOL 10 MG/ML EMULSION: Performed by: NURSE ANESTHETIST, CERTIFIED REGISTERED

## 2022-02-28 PROCEDURE — 22850 REMOVE SPINE FIXATION DEVICE: CPT | Performed by: NEUROLOGICAL SURGERY

## 2022-02-28 PROCEDURE — 81025 URINE PREGNANCY TEST: CPT | Performed by: ANESTHESIOLOGY

## 2022-02-28 PROCEDURE — 25010000002 FENTANYL CITRATE (PF) 250 MCG/5ML SOLUTION

## 2022-02-28 PROCEDURE — 22840 INSERT SPINE FIXATION DEVICE: CPT | Performed by: PHYSICIAN ASSISTANT

## 2022-02-28 PROCEDURE — 0 CEFAZOLIN IN DEXTROSE 2-4 GM/100ML-% SOLUTION: Performed by: NEUROLOGICAL SURGERY

## 2022-02-28 PROCEDURE — 25010000002 DEXAMETHASONE PER 1 MG: Performed by: NURSE ANESTHETIST, CERTIFIED REGISTERED

## 2022-02-28 PROCEDURE — 25010000002 NEOSTIGMINE 10 MG/10ML SOLUTION: Performed by: NURSE ANESTHETIST, CERTIFIED REGISTERED

## 2022-02-28 PROCEDURE — 22853 INSJ BIOMECHANICAL DEVICE: CPT | Performed by: NEUROLOGICAL SURGERY

## 2022-02-28 PROCEDURE — 22850 REMOVE SPINE FIXATION DEVICE: CPT | Performed by: PHYSICIAN ASSISTANT

## 2022-02-28 PROCEDURE — 22853 INSJ BIOMECHANICAL DEVICE: CPT | Performed by: PHYSICIAN ASSISTANT

## 2022-02-28 PROCEDURE — 25010000002 VANCOMYCIN 1 G RECONSTITUTED SOLUTION: Performed by: NEUROLOGICAL SURGERY

## 2022-02-28 PROCEDURE — 63052 LAM FACETC/FRMT ARTHRD LUM 1: CPT | Performed by: PHYSICIAN ASSISTANT

## 2022-02-28 PROCEDURE — 76000 FLUOROSCOPY <1 HR PHYS/QHP: CPT

## 2022-02-28 PROCEDURE — 25010000002 ONDANSETRON PER 1 MG: Performed by: NURSE ANESTHETIST, CERTIFIED REGISTERED

## 2022-02-28 PROCEDURE — 22630 ARTHRD PST TQ 1NTRSPC LUM: CPT | Performed by: PHYSICIAN ASSISTANT

## 2022-02-28 DEVICE — DBM T44145 5CC ORTHOBLEND SMALL DEFGRAFT
Type: IMPLANTABLE DEVICE | Site: SPINE LUMBAR | Status: FUNCTIONAL
Brand: GRAFTON®AND GRAFTON PLUS®DEMINERALIZED BONE MATRIX (DBM)

## 2022-02-28 DEVICE — ROD 7893050 6.35MM X 50MM PEEK ROD
Type: IMPLANTABLE DEVICE | Site: SPINE LUMBAR | Status: FUNCTIONAL
Brand: CD HORIZON® SPINAL SYSTEM

## 2022-02-28 DEVICE — HEMOST ABS SURGIFOAM SZ100 8X12 10MM: Type: IMPLANTABLE DEVICE | Site: SPINE LUMBAR | Status: FUNCTIONAL

## 2022-02-28 DEVICE — SPACER 3992211 22MM X 11MM
Type: IMPLANTABLE DEVICE | Site: SPINE LUMBAR | Status: FUNCTIONAL
Brand: CAPSTONE PTC™ SPINAL SYSTEM

## 2022-02-28 DEVICE — FLOSEAL HEMOSTATIC MATRIX, 10ML
Type: IMPLANTABLE DEVICE | Site: SPINE LUMBAR | Status: FUNCTIONAL
Brand: FLOSEAL HEMOSTATIC MATRIX

## 2022-02-28 RX ORDER — SODIUM CHLORIDE 0.9 % (FLUSH) 0.9 %
10 SYRINGE (ML) INJECTION EVERY 12 HOURS SCHEDULED
Status: DISCONTINUED | OUTPATIENT
Start: 2022-02-28 | End: 2022-03-03 | Stop reason: HOSPADM

## 2022-02-28 RX ORDER — NALOXONE HCL 0.4 MG/ML
0.4 VIAL (ML) INJECTION
Status: DISCONTINUED | OUTPATIENT
Start: 2022-02-28 | End: 2022-03-03 | Stop reason: HOSPADM

## 2022-02-28 RX ORDER — LIDOCAINE HYDROCHLORIDE 10 MG/ML
INJECTION, SOLUTION EPIDURAL; INFILTRATION; INTRACAUDAL; PERINEURAL AS NEEDED
Status: DISCONTINUED | OUTPATIENT
Start: 2022-02-28 | End: 2022-02-28 | Stop reason: SURG

## 2022-02-28 RX ORDER — SODIUM CHLORIDE 0.9 % (FLUSH) 0.9 %
3 SYRINGE (ML) INJECTION EVERY 12 HOURS SCHEDULED
Status: DISCONTINUED | OUTPATIENT
Start: 2022-02-28 | End: 2022-02-28 | Stop reason: HOSPADM

## 2022-02-28 RX ORDER — SODIUM CHLORIDE 0.9 % (FLUSH) 0.9 %
3-10 SYRINGE (ML) INJECTION AS NEEDED
Status: DISCONTINUED | OUTPATIENT
Start: 2022-02-28 | End: 2022-02-28 | Stop reason: HOSPADM

## 2022-02-28 RX ORDER — PROPOFOL 10 MG/ML
VIAL (ML) INTRAVENOUS AS NEEDED
Status: DISCONTINUED | OUTPATIENT
Start: 2022-02-28 | End: 2022-02-28 | Stop reason: SURG

## 2022-02-28 RX ORDER — DIPHENHYDRAMINE HCL 25 MG
25 CAPSULE ORAL NIGHTLY PRN
Status: DISCONTINUED | OUTPATIENT
Start: 2022-02-28 | End: 2022-03-03 | Stop reason: HOSPADM

## 2022-02-28 RX ORDER — HYDROCHLOROTHIAZIDE 25 MG/1
25 TABLET ORAL DAILY
Status: DISCONTINUED | OUTPATIENT
Start: 2022-02-28 | End: 2022-03-03 | Stop reason: HOSPADM

## 2022-02-28 RX ORDER — DOCUSATE SODIUM 100 MG/1
100 CAPSULE, LIQUID FILLED ORAL 2 TIMES DAILY PRN
Status: DISCONTINUED | OUTPATIENT
Start: 2022-02-28 | End: 2022-03-03 | Stop reason: HOSPADM

## 2022-02-28 RX ORDER — HYDROCODONE BITARTRATE AND ACETAMINOPHEN 5; 325 MG/1; MG/1
1 TABLET ORAL ONCE AS NEEDED
Status: DISCONTINUED | OUTPATIENT
Start: 2022-02-28 | End: 2022-02-28 | Stop reason: HOSPADM

## 2022-02-28 RX ORDER — LABETALOL HYDROCHLORIDE 5 MG/ML
5 INJECTION, SOLUTION INTRAVENOUS
Status: DISCONTINUED | OUTPATIENT
Start: 2022-02-28 | End: 2022-02-28 | Stop reason: HOSPADM

## 2022-02-28 RX ORDER — ACETAMINOPHEN 500 MG
1000 TABLET ORAL ONCE
Status: COMPLETED | OUTPATIENT
Start: 2022-02-28 | End: 2022-02-28

## 2022-02-28 RX ORDER — SODIUM CHLORIDE, SODIUM LACTATE, POTASSIUM CHLORIDE, CALCIUM CHLORIDE 600; 310; 30; 20 MG/100ML; MG/100ML; MG/100ML; MG/100ML
9 INJECTION, SOLUTION INTRAVENOUS CONTINUOUS
Status: DISCONTINUED | OUTPATIENT
Start: 2022-02-28 | End: 2022-03-03 | Stop reason: HOSPADM

## 2022-02-28 RX ORDER — BUPIVACAINE HYDROCHLORIDE AND EPINEPHRINE 2.5; 5 MG/ML; UG/ML
INJECTION, SOLUTION EPIDURAL; INFILTRATION; INTRACAUDAL; PERINEURAL AS NEEDED
Status: DISCONTINUED | OUTPATIENT
Start: 2022-02-28 | End: 2022-02-28 | Stop reason: HOSPADM

## 2022-02-28 RX ORDER — ONDANSETRON 2 MG/ML
4 INJECTION INTRAMUSCULAR; INTRAVENOUS EVERY 6 HOURS PRN
Status: DISCONTINUED | OUTPATIENT
Start: 2022-02-28 | End: 2022-03-03 | Stop reason: HOSPADM

## 2022-02-28 RX ORDER — MIDAZOLAM HYDROCHLORIDE 1 MG/ML
1 INJECTION INTRAMUSCULAR; INTRAVENOUS
Status: DISCONTINUED | OUTPATIENT
Start: 2022-02-28 | End: 2022-02-28 | Stop reason: HOSPADM

## 2022-02-28 RX ORDER — MORPHINE SULFATE 4 MG/ML
2 INJECTION, SOLUTION INTRAMUSCULAR; INTRAVENOUS EVERY 4 HOURS PRN
Status: DISCONTINUED | OUTPATIENT
Start: 2022-02-28 | End: 2022-03-03 | Stop reason: HOSPADM

## 2022-02-28 RX ORDER — NALOXONE HCL 0.4 MG/ML
0.4 VIAL (ML) INJECTION AS NEEDED
Status: DISCONTINUED | OUTPATIENT
Start: 2022-02-28 | End: 2022-02-28 | Stop reason: HOSPADM

## 2022-02-28 RX ORDER — MAGNESIUM HYDROXIDE 1200 MG/15ML
LIQUID ORAL AS NEEDED
Status: DISCONTINUED | OUTPATIENT
Start: 2022-02-28 | End: 2022-02-28 | Stop reason: HOSPADM

## 2022-02-28 RX ORDER — CEFAZOLIN SODIUM 2 G/100ML
2 INJECTION, SOLUTION INTRAVENOUS ONCE
Status: COMPLETED | OUTPATIENT
Start: 2022-02-28 | End: 2022-02-28

## 2022-02-28 RX ORDER — DEXAMETHASONE SODIUM PHOSPHATE 4 MG/ML
INJECTION, SOLUTION INTRA-ARTICULAR; INTRALESIONAL; INTRAMUSCULAR; INTRAVENOUS; SOFT TISSUE AS NEEDED
Status: DISCONTINUED | OUTPATIENT
Start: 2022-02-28 | End: 2022-02-28 | Stop reason: SURG

## 2022-02-28 RX ORDER — NEOSTIGMINE METHYLSULFATE 1 MG/ML
INJECTION, SOLUTION INTRAVENOUS AS NEEDED
Status: DISCONTINUED | OUTPATIENT
Start: 2022-02-28 | End: 2022-02-28 | Stop reason: SURG

## 2022-02-28 RX ORDER — LIDOCAINE HYDROCHLORIDE 10 MG/ML
0.5 INJECTION, SOLUTION EPIDURAL; INFILTRATION; INTRACAUDAL; PERINEURAL ONCE AS NEEDED
Status: COMPLETED | OUTPATIENT
Start: 2022-02-28 | End: 2022-02-28

## 2022-02-28 RX ORDER — SODIUM CHLORIDE 0.9 % (FLUSH) 0.9 %
10 SYRINGE (ML) INJECTION AS NEEDED
Status: DISCONTINUED | OUTPATIENT
Start: 2022-02-28 | End: 2022-02-28 | Stop reason: HOSPADM

## 2022-02-28 RX ORDER — ACETAMINOPHEN 325 MG/1
650 TABLET ORAL 3 TIMES DAILY
Status: DISCONTINUED | OUTPATIENT
Start: 2022-02-28 | End: 2022-03-03 | Stop reason: HOSPADM

## 2022-02-28 RX ORDER — IBUPROFEN 800 MG/1
800 TABLET ORAL ONCE
Status: COMPLETED | OUTPATIENT
Start: 2022-02-28 | End: 2022-02-28

## 2022-02-28 RX ORDER — PROMETHAZINE HYDROCHLORIDE 25 MG/1
25 SUPPOSITORY RECTAL ONCE AS NEEDED
Status: DISCONTINUED | OUTPATIENT
Start: 2022-02-28 | End: 2022-02-28 | Stop reason: HOSPADM

## 2022-02-28 RX ORDER — ONDANSETRON 2 MG/ML
INJECTION INTRAMUSCULAR; INTRAVENOUS AS NEEDED
Status: DISCONTINUED | OUTPATIENT
Start: 2022-02-28 | End: 2022-02-28 | Stop reason: SURG

## 2022-02-28 RX ORDER — AMOXICILLIN 250 MG
2 CAPSULE ORAL NIGHTLY PRN
Status: DISCONTINUED | OUTPATIENT
Start: 2022-02-28 | End: 2022-03-03 | Stop reason: HOSPADM

## 2022-02-28 RX ORDER — GLYCOPYRROLATE 0.2 MG/ML
INJECTION INTRAMUSCULAR; INTRAVENOUS AS NEEDED
Status: DISCONTINUED | OUTPATIENT
Start: 2022-02-28 | End: 2022-02-28 | Stop reason: SURG

## 2022-02-28 RX ORDER — VANCOMYCIN HYDROCHLORIDE 1 G/20ML
INJECTION, POWDER, LYOPHILIZED, FOR SOLUTION INTRAVENOUS AS NEEDED
Status: DISCONTINUED | OUTPATIENT
Start: 2022-02-28 | End: 2022-02-28 | Stop reason: HOSPADM

## 2022-02-28 RX ORDER — OXYCODONE HCL 10 MG/1
10 TABLET, FILM COATED, EXTENDED RELEASE ORAL ONCE
Status: COMPLETED | OUTPATIENT
Start: 2022-02-28 | End: 2022-02-28

## 2022-02-28 RX ORDER — PROMETHAZINE HYDROCHLORIDE 12.5 MG/1
12.5 SUPPOSITORY RECTAL EVERY 6 HOURS PRN
Status: DISCONTINUED | OUTPATIENT
Start: 2022-02-28 | End: 2022-03-03 | Stop reason: HOSPADM

## 2022-02-28 RX ORDER — BISACODYL 10 MG
10 SUPPOSITORY, RECTAL RECTAL DAILY PRN
Status: DISCONTINUED | OUTPATIENT
Start: 2022-02-28 | End: 2022-03-03 | Stop reason: HOSPADM

## 2022-02-28 RX ORDER — FENTANYL CITRATE 50 UG/ML
INJECTION, SOLUTION INTRAMUSCULAR; INTRAVENOUS AS NEEDED
Status: DISCONTINUED | OUTPATIENT
Start: 2022-02-28 | End: 2022-02-28 | Stop reason: SURG

## 2022-02-28 RX ORDER — OXYCODONE AND ACETAMINOPHEN 7.5; 325 MG/1; MG/1
1 TABLET ORAL EVERY 4 HOURS PRN
Status: DISCONTINUED | OUTPATIENT
Start: 2022-02-28 | End: 2022-03-03 | Stop reason: HOSPADM

## 2022-02-28 RX ORDER — FAMOTIDINE 20 MG/1
20 TABLET, FILM COATED ORAL
Status: COMPLETED | OUTPATIENT
Start: 2022-02-28 | End: 2022-02-28

## 2022-02-28 RX ORDER — IBUPROFEN 600 MG/1
600 TABLET ORAL 3 TIMES DAILY
Status: DISCONTINUED | OUTPATIENT
Start: 2022-02-28 | End: 2022-03-03 | Stop reason: HOSPADM

## 2022-02-28 RX ORDER — HYDROMORPHONE HYDROCHLORIDE 1 MG/ML
0.5 INJECTION, SOLUTION INTRAMUSCULAR; INTRAVENOUS; SUBCUTANEOUS
Status: COMPLETED | OUTPATIENT
Start: 2022-02-28 | End: 2022-02-28

## 2022-02-28 RX ORDER — SODIUM CHLORIDE 9 MG/ML
INJECTION, SOLUTION INTRAVENOUS AS NEEDED
Status: DISCONTINUED | OUTPATIENT
Start: 2022-02-28 | End: 2022-02-28 | Stop reason: HOSPADM

## 2022-02-28 RX ORDER — IPRATROPIUM BROMIDE AND ALBUTEROL SULFATE 2.5; .5 MG/3ML; MG/3ML
3 SOLUTION RESPIRATORY (INHALATION) ONCE AS NEEDED
Status: DISCONTINUED | OUTPATIENT
Start: 2022-02-28 | End: 2022-02-28 | Stop reason: HOSPADM

## 2022-02-28 RX ORDER — PROMETHAZINE HYDROCHLORIDE 25 MG/1
25 TABLET ORAL ONCE AS NEEDED
Status: DISCONTINUED | OUTPATIENT
Start: 2022-02-28 | End: 2022-02-28 | Stop reason: HOSPADM

## 2022-02-28 RX ORDER — SODIUM CHLORIDE 0.9 % (FLUSH) 0.9 %
10 SYRINGE (ML) INJECTION EVERY 12 HOURS SCHEDULED
Status: DISCONTINUED | OUTPATIENT
Start: 2022-02-28 | End: 2022-02-28 | Stop reason: HOSPADM

## 2022-02-28 RX ORDER — ONDANSETRON 4 MG/1
4 TABLET, FILM COATED ORAL EVERY 6 HOURS PRN
Status: DISCONTINUED | OUTPATIENT
Start: 2022-02-28 | End: 2022-03-03 | Stop reason: HOSPADM

## 2022-02-28 RX ORDER — FAMOTIDINE 20 MG/1
20 TABLET, FILM COATED ORAL EVERY 12 HOURS SCHEDULED
Status: DISCONTINUED | OUTPATIENT
Start: 2022-02-28 | End: 2022-03-03 | Stop reason: HOSPADM

## 2022-02-28 RX ORDER — CEFAZOLIN SODIUM 2 G/100ML
2 INJECTION, SOLUTION INTRAVENOUS EVERY 8 HOURS
Status: COMPLETED | OUTPATIENT
Start: 2022-02-28 | End: 2022-03-01

## 2022-02-28 RX ORDER — SODIUM CHLORIDE 0.9 % (FLUSH) 0.9 %
10 SYRINGE (ML) INJECTION AS NEEDED
Status: DISCONTINUED | OUTPATIENT
Start: 2022-02-28 | End: 2022-03-03 | Stop reason: HOSPADM

## 2022-02-28 RX ORDER — MORPHINE SULFATE 4 MG/ML
5 INJECTION, SOLUTION INTRAMUSCULAR; INTRAVENOUS EVERY 4 HOURS PRN
Status: DISCONTINUED | OUTPATIENT
Start: 2022-02-28 | End: 2022-03-03 | Stop reason: HOSPADM

## 2022-02-28 RX ORDER — FENTANYL CITRATE 50 UG/ML
INJECTION, SOLUTION INTRAMUSCULAR; INTRAVENOUS
Status: COMPLETED
Start: 2022-02-28 | End: 2022-02-28

## 2022-02-28 RX ORDER — SODIUM CHLORIDE, SODIUM LACTATE, POTASSIUM CHLORIDE, CALCIUM CHLORIDE 600; 310; 30; 20 MG/100ML; MG/100ML; MG/100ML; MG/100ML
90 INJECTION, SOLUTION INTRAVENOUS CONTINUOUS
Status: DISCONTINUED | OUTPATIENT
Start: 2022-02-28 | End: 2022-03-01

## 2022-02-28 RX ORDER — PROMETHAZINE HYDROCHLORIDE 12.5 MG/1
12.5 TABLET ORAL EVERY 6 HOURS PRN
Status: DISCONTINUED | OUTPATIENT
Start: 2022-02-28 | End: 2022-03-03 | Stop reason: HOSPADM

## 2022-02-28 RX ORDER — FENTANYL CITRATE 50 UG/ML
50 INJECTION, SOLUTION INTRAMUSCULAR; INTRAVENOUS
Status: DISCONTINUED | OUTPATIENT
Start: 2022-02-28 | End: 2022-02-28 | Stop reason: HOSPADM

## 2022-02-28 RX ORDER — ROCURONIUM BROMIDE 10 MG/ML
INJECTION, SOLUTION INTRAVENOUS AS NEEDED
Status: DISCONTINUED | OUTPATIENT
Start: 2022-02-28 | End: 2022-02-28 | Stop reason: SURG

## 2022-02-28 RX ORDER — DROPERIDOL 2.5 MG/ML
0.62 INJECTION, SOLUTION INTRAMUSCULAR; INTRAVENOUS AS NEEDED
Status: DISCONTINUED | OUTPATIENT
Start: 2022-02-28 | End: 2022-02-28 | Stop reason: HOSPADM

## 2022-02-28 RX ORDER — MEPERIDINE HYDROCHLORIDE 25 MG/ML
12.5 INJECTION INTRAMUSCULAR; INTRAVENOUS; SUBCUTANEOUS
Status: DISCONTINUED | OUTPATIENT
Start: 2022-02-28 | End: 2022-02-28 | Stop reason: HOSPADM

## 2022-02-28 RX ORDER — DROPERIDOL 2.5 MG/ML
0.62 INJECTION, SOLUTION INTRAMUSCULAR; INTRAVENOUS ONCE AS NEEDED
Status: DISCONTINUED | OUTPATIENT
Start: 2022-02-28 | End: 2022-02-28 | Stop reason: HOSPADM

## 2022-02-28 RX ORDER — ONDANSETRON 2 MG/ML
4 INJECTION INTRAMUSCULAR; INTRAVENOUS ONCE AS NEEDED
Status: DISCONTINUED | OUTPATIENT
Start: 2022-02-28 | End: 2022-02-28 | Stop reason: HOSPADM

## 2022-02-28 RX ORDER — EPHEDRINE SULFATE 50 MG/ML
INJECTION, SOLUTION INTRAVENOUS AS NEEDED
Status: DISCONTINUED | OUTPATIENT
Start: 2022-02-28 | End: 2022-02-28 | Stop reason: SURG

## 2022-02-28 RX ADMIN — EPHEDRINE SULFATE 5 MG: 50 INJECTION INTRAVENOUS at 13:48

## 2022-02-28 RX ADMIN — IBUPROFEN 600 MG: 600 TABLET ORAL at 20:09

## 2022-02-28 RX ADMIN — ROCURONIUM BROMIDE 10 MG: 10 INJECTION, SOLUTION INTRAVENOUS at 13:30

## 2022-02-28 RX ADMIN — GLYCOPYRROLATE 0.4 MG: 0.2 INJECTION INTRAMUSCULAR; INTRAVENOUS at 14:25

## 2022-02-28 RX ADMIN — OXYCODONE HYDROCHLORIDE 10 MG: 10 TABLET, FILM COATED, EXTENDED RELEASE ORAL at 10:06

## 2022-02-28 RX ADMIN — FAMOTIDINE 20 MG: 20 TABLET, FILM COATED ORAL at 20:08

## 2022-02-28 RX ADMIN — DEXAMETHASONE SODIUM PHOSPHATE 8 MG: 4 INJECTION, SOLUTION INTRA-ARTICULAR; INTRALESIONAL; INTRAMUSCULAR; INTRAVENOUS; SOFT TISSUE at 12:28

## 2022-02-28 RX ADMIN — FENTANYL CITRATE 50 MCG: 50 INJECTION, SOLUTION INTRAMUSCULAR; INTRAVENOUS at 15:19

## 2022-02-28 RX ADMIN — LIDOCAINE HYDROCHLORIDE 50 MG: 10 INJECTION, SOLUTION EPIDURAL; INFILTRATION; INTRACAUDAL; PERINEURAL at 12:02

## 2022-02-28 RX ADMIN — FENTANYL CITRATE 50 MCG: 50 INJECTION, SOLUTION INTRAMUSCULAR; INTRAVENOUS at 15:35

## 2022-02-28 RX ADMIN — ONDANSETRON 4 MG: 2 INJECTION INTRAMUSCULAR; INTRAVENOUS at 14:25

## 2022-02-28 RX ADMIN — FENTANYL CITRATE 100 MCG: 50 INJECTION, SOLUTION INTRAMUSCULAR; INTRAVENOUS at 12:02

## 2022-02-28 RX ADMIN — HYDROMORPHONE HYDROCHLORIDE 0.5 MG: 1 INJECTION, SOLUTION INTRAMUSCULAR; INTRAVENOUS; SUBCUTANEOUS at 15:25

## 2022-02-28 RX ADMIN — MUPIROCIN 1 APPLICATION: 20 OINTMENT TOPICAL at 10:06

## 2022-02-28 RX ADMIN — FENTANYL CITRATE 50 MCG: 50 INJECTION, SOLUTION INTRAMUSCULAR; INTRAVENOUS at 15:03

## 2022-02-28 RX ADMIN — HYDROMORPHONE HYDROCHLORIDE 0.5 MG: 1 INJECTION, SOLUTION INTRAMUSCULAR; INTRAVENOUS; SUBCUTANEOUS at 15:09

## 2022-02-28 RX ADMIN — MUPIROCIN: 20 OINTMENT TOPICAL at 20:08

## 2022-02-28 RX ADMIN — PROPOFOL 150 MG: 10 INJECTION, EMULSION INTRAVENOUS at 12:02

## 2022-02-28 RX ADMIN — MORPHINE SULFATE 2 MG: 4 INJECTION, SOLUTION INTRAMUSCULAR; INTRAVENOUS at 16:55

## 2022-02-28 RX ADMIN — HYDROMORPHONE HYDROCHLORIDE 0.5 MG: 1 INJECTION, SOLUTION INTRAMUSCULAR; INTRAVENOUS; SUBCUTANEOUS at 15:55

## 2022-02-28 RX ADMIN — SODIUM CHLORIDE, POTASSIUM CHLORIDE, SODIUM LACTATE AND CALCIUM CHLORIDE: 600; 310; 30; 20 INJECTION, SOLUTION INTRAVENOUS at 14:30

## 2022-02-28 RX ADMIN — ROCURONIUM BROMIDE 50 MG: 10 INJECTION, SOLUTION INTRAVENOUS at 12:02

## 2022-02-28 RX ADMIN — ACETAMINOPHEN 650 MG: 325 TABLET ORAL at 20:09

## 2022-02-28 RX ADMIN — CEFAZOLIN SODIUM 2 G: 2 INJECTION, SOLUTION INTRAVENOUS at 12:08

## 2022-02-28 RX ADMIN — NEOSTIGMINE METHYLSULFATE 3 MG: 0.5 INJECTION INTRAVENOUS at 14:25

## 2022-02-28 RX ADMIN — FAMOTIDINE 20 MG: 20 TABLET, FILM COATED ORAL at 10:06

## 2022-02-28 RX ADMIN — SODIUM CHLORIDE, POTASSIUM CHLORIDE, SODIUM LACTATE AND CALCIUM CHLORIDE 9 ML/HR: 600; 310; 30; 20 INJECTION, SOLUTION INTRAVENOUS at 09:44

## 2022-02-28 RX ADMIN — ROCURONIUM BROMIDE 20 MG: 10 INJECTION, SOLUTION INTRAVENOUS at 12:54

## 2022-02-28 RX ADMIN — MORPHINE SULFATE 2 MG: 4 INJECTION, SOLUTION INTRAMUSCULAR; INTRAVENOUS at 22:25

## 2022-02-28 RX ADMIN — OXYCODONE HYDROCHLORIDE AND ACETAMINOPHEN 1 TABLET: 7.5; 325 TABLET ORAL at 18:12

## 2022-02-28 RX ADMIN — HYDROMORPHONE HYDROCHLORIDE 0.5 MG: 1 INJECTION, SOLUTION INTRAMUSCULAR; INTRAVENOUS; SUBCUTANEOUS at 15:44

## 2022-02-28 RX ADMIN — IBUPROFEN 800 MG: 800 TABLET, FILM COATED ORAL at 10:06

## 2022-02-28 RX ADMIN — SODIUM CHLORIDE, POTASSIUM CHLORIDE, SODIUM LACTATE AND CALCIUM CHLORIDE 90 ML/HR: 600; 310; 30; 20 INJECTION, SOLUTION INTRAVENOUS at 16:35

## 2022-02-28 RX ADMIN — LIDOCAINE HYDROCHLORIDE 0.2 ML: 10 INJECTION, SOLUTION EPIDURAL; INFILTRATION; INTRACAUDAL; PERINEURAL at 09:44

## 2022-02-28 RX ADMIN — OXYCODONE HYDROCHLORIDE AND ACETAMINOPHEN 1 TABLET: 7.5; 325 TABLET ORAL at 22:46

## 2022-02-28 RX ADMIN — HYDROCHLOROTHIAZIDE 25 MG: 25 TABLET ORAL at 16:55

## 2022-02-28 RX ADMIN — PROPOFOL 25 MCG/KG/MIN: 10 INJECTION, EMULSION INTRAVENOUS at 12:05

## 2022-02-28 RX ADMIN — EPHEDRINE SULFATE 5 MG: 50 INJECTION INTRAVENOUS at 13:17

## 2022-02-28 RX ADMIN — IBUPROFEN 600 MG: 600 TABLET ORAL at 16:55

## 2022-02-28 RX ADMIN — ACETAMINOPHEN 650 MG: 325 TABLET ORAL at 16:55

## 2022-02-28 RX ADMIN — ACETAMINOPHEN 1000 MG: 500 TABLET ORAL at 10:06

## 2022-02-28 RX ADMIN — NICOTINE 1 PATCH: 7 PATCH, EXTENDED RELEASE TRANSDERMAL at 16:55

## 2022-02-28 RX ADMIN — CEFAZOLIN SODIUM 2 G: 2 INJECTION, SOLUTION INTRAVENOUS at 20:08

## 2022-03-01 LAB
HCT VFR BLD AUTO: 29.9 % (ref 34–46.6)
HGB BLD-MCNC: 9.9 G/DL (ref 12–15.9)

## 2022-03-01 PROCEDURE — 85018 HEMOGLOBIN: CPT | Performed by: NEUROLOGICAL SURGERY

## 2022-03-01 PROCEDURE — 0 CEFAZOLIN IN DEXTROSE 2-4 GM/100ML-% SOLUTION: Performed by: NEUROLOGICAL SURGERY

## 2022-03-01 PROCEDURE — 25010000002 MORPHINE PER 10 MG: Performed by: NEUROLOGICAL SURGERY

## 2022-03-01 PROCEDURE — 97165 OT EVAL LOW COMPLEX 30 MIN: CPT

## 2022-03-01 PROCEDURE — 97161 PT EVAL LOW COMPLEX 20 MIN: CPT

## 2022-03-01 PROCEDURE — 97116 GAIT TRAINING THERAPY: CPT

## 2022-03-01 PROCEDURE — 85014 HEMATOCRIT: CPT | Performed by: NEUROLOGICAL SURGERY

## 2022-03-01 PROCEDURE — 97535 SELF CARE MNGMENT TRAINING: CPT

## 2022-03-01 PROCEDURE — 25010000002 CEFAZOLIN IN DEXTROSE 2-4 GM/100ML-% SOLUTION: Performed by: NEUROLOGICAL SURGERY

## 2022-03-01 PROCEDURE — 99024 POSTOP FOLLOW-UP VISIT: CPT | Performed by: NEUROLOGICAL SURGERY

## 2022-03-01 RX ADMIN — ACETAMINOPHEN 650 MG: 325 TABLET ORAL at 08:17

## 2022-03-01 RX ADMIN — OXYCODONE HYDROCHLORIDE AND ACETAMINOPHEN 1 TABLET: 7.5; 325 TABLET ORAL at 18:10

## 2022-03-01 RX ADMIN — CEFAZOLIN SODIUM 2 G: 2 INJECTION, SOLUTION INTRAVENOUS at 05:26

## 2022-03-01 RX ADMIN — IBUPROFEN 600 MG: 600 TABLET ORAL at 20:16

## 2022-03-01 RX ADMIN — DOCUSATE SODIUM 100 MG: 100 CAPSULE, LIQUID FILLED ORAL at 09:53

## 2022-03-01 RX ADMIN — IBUPROFEN 600 MG: 600 TABLET ORAL at 08:17

## 2022-03-01 RX ADMIN — MUPIROCIN: 20 OINTMENT TOPICAL at 20:16

## 2022-03-01 RX ADMIN — Medication 10 ML: at 20:16

## 2022-03-01 RX ADMIN — OXYCODONE HYDROCHLORIDE AND ACETAMINOPHEN 1 TABLET: 7.5; 325 TABLET ORAL at 02:38

## 2022-03-01 RX ADMIN — OXYCODONE HYDROCHLORIDE AND ACETAMINOPHEN 1 TABLET: 7.5; 325 TABLET ORAL at 13:11

## 2022-03-01 RX ADMIN — IBUPROFEN 600 MG: 600 TABLET ORAL at 15:29

## 2022-03-01 RX ADMIN — ACETAMINOPHEN 650 MG: 325 TABLET ORAL at 20:16

## 2022-03-01 RX ADMIN — OXYCODONE HYDROCHLORIDE AND ACETAMINOPHEN 1 TABLET: 7.5; 325 TABLET ORAL at 22:02

## 2022-03-01 RX ADMIN — HYDROCHLOROTHIAZIDE 25 MG: 25 TABLET ORAL at 08:18

## 2022-03-01 RX ADMIN — MORPHINE SULFATE 2 MG: 4 INJECTION, SOLUTION INTRAMUSCULAR; INTRAVENOUS at 15:39

## 2022-03-01 RX ADMIN — MORPHINE SULFATE 2 MG: 4 INJECTION, SOLUTION INTRAMUSCULAR; INTRAVENOUS at 05:29

## 2022-03-01 RX ADMIN — NICOTINE 1 PATCH: 7 PATCH, EXTENDED RELEASE TRANSDERMAL at 08:18

## 2022-03-01 RX ADMIN — FAMOTIDINE 20 MG: 20 TABLET, FILM COATED ORAL at 08:17

## 2022-03-01 RX ADMIN — FAMOTIDINE 20 MG: 20 TABLET, FILM COATED ORAL at 20:16

## 2022-03-01 RX ADMIN — MORPHINE SULFATE 2 MG: 4 INJECTION, SOLUTION INTRAMUSCULAR; INTRAVENOUS at 09:53

## 2022-03-02 PROCEDURE — 63710000001 ONDANSETRON PER 8 MG: Performed by: NEUROLOGICAL SURGERY

## 2022-03-02 PROCEDURE — 97530 THERAPEUTIC ACTIVITIES: CPT

## 2022-03-02 PROCEDURE — 99024 POSTOP FOLLOW-UP VISIT: CPT | Performed by: NEUROLOGICAL SURGERY

## 2022-03-02 PROCEDURE — 97116 GAIT TRAINING THERAPY: CPT

## 2022-03-02 PROCEDURE — 25010000002 MORPHINE PER 10 MG: Performed by: NEUROLOGICAL SURGERY

## 2022-03-02 PROCEDURE — 97535 SELF CARE MNGMENT TRAINING: CPT

## 2022-03-02 RX ADMIN — Medication 10 ML: at 20:23

## 2022-03-02 RX ADMIN — MORPHINE SULFATE 2 MG: 4 INJECTION, SOLUTION INTRAMUSCULAR; INTRAVENOUS at 17:06

## 2022-03-02 RX ADMIN — ACETAMINOPHEN 650 MG: 325 TABLET ORAL at 15:27

## 2022-03-02 RX ADMIN — IBUPROFEN 600 MG: 600 TABLET ORAL at 20:23

## 2022-03-02 RX ADMIN — MUPIROCIN: 20 OINTMENT TOPICAL at 20:22

## 2022-03-02 RX ADMIN — FAMOTIDINE 20 MG: 20 TABLET, FILM COATED ORAL at 20:23

## 2022-03-02 RX ADMIN — NICOTINE 1 PATCH: 7 PATCH, EXTENDED RELEASE TRANSDERMAL at 08:03

## 2022-03-02 RX ADMIN — ONDANSETRON HYDROCHLORIDE 4 MG: 4 TABLET, FILM COATED ORAL at 17:10

## 2022-03-02 RX ADMIN — IBUPROFEN 600 MG: 600 TABLET ORAL at 15:27

## 2022-03-02 RX ADMIN — OXYCODONE HYDROCHLORIDE AND ACETAMINOPHEN 1 TABLET: 7.5; 325 TABLET ORAL at 13:54

## 2022-03-02 RX ADMIN — MORPHINE SULFATE 2 MG: 4 INJECTION, SOLUTION INTRAMUSCULAR; INTRAVENOUS at 09:57

## 2022-03-02 RX ADMIN — HYDROCHLOROTHIAZIDE 25 MG: 25 TABLET ORAL at 08:01

## 2022-03-02 RX ADMIN — OXYCODONE HYDROCHLORIDE AND ACETAMINOPHEN 1 TABLET: 7.5; 325 TABLET ORAL at 02:44

## 2022-03-02 RX ADMIN — FAMOTIDINE 20 MG: 20 TABLET, FILM COATED ORAL at 08:01

## 2022-03-02 RX ADMIN — OXYCODONE HYDROCHLORIDE AND ACETAMINOPHEN 1 TABLET: 7.5; 325 TABLET ORAL at 08:01

## 2022-03-02 RX ADMIN — ACETAMINOPHEN 650 MG: 325 TABLET ORAL at 20:22

## 2022-03-02 RX ADMIN — IBUPROFEN 600 MG: 600 TABLET ORAL at 08:01

## 2022-03-03 ENCOUNTER — TELEPHONE (OUTPATIENT)
Dept: NEUROSURGERY | Facility: CLINIC | Age: 42
End: 2022-03-03

## 2022-03-03 VITALS
HEIGHT: 66 IN | HEART RATE: 74 BPM | OXYGEN SATURATION: 100 % | BODY MASS INDEX: 32.62 KG/M2 | SYSTOLIC BLOOD PRESSURE: 103 MMHG | RESPIRATION RATE: 18 BRPM | TEMPERATURE: 97.9 F | DIASTOLIC BLOOD PRESSURE: 63 MMHG | WEIGHT: 203 LBS

## 2022-03-03 DIAGNOSIS — Z98.1 S/P LUMBAR FUSION: Primary | ICD-10-CM

## 2022-03-03 PROCEDURE — 97110 THERAPEUTIC EXERCISES: CPT

## 2022-03-03 PROCEDURE — 97535 SELF CARE MNGMENT TRAINING: CPT

## 2022-03-03 PROCEDURE — 94799 UNLISTED PULMONARY SVC/PX: CPT

## 2022-03-03 PROCEDURE — 99024 POSTOP FOLLOW-UP VISIT: CPT | Performed by: NEUROLOGICAL SURGERY

## 2022-03-03 PROCEDURE — 97116 GAIT TRAINING THERAPY: CPT

## 2022-03-03 RX ORDER — OXYCODONE AND ACETAMINOPHEN 7.5; 325 MG/1; MG/1
1 TABLET ORAL 3 TIMES DAILY PRN
Qty: 40 TABLET | Refills: 0 | Status: SHIPPED | OUTPATIENT
Start: 2022-03-03 | End: 2022-03-23

## 2022-03-03 RX ORDER — IBUPROFEN 800 MG/1
800 TABLET ORAL EVERY 8 HOURS PRN
Qty: 90 TABLET | Refills: 0 | Status: SHIPPED | OUTPATIENT
Start: 2022-03-03 | End: 2022-10-14 | Stop reason: SDUPTHER

## 2022-03-03 RX ADMIN — NICOTINE 1 PATCH: 7 PATCH, EXTENDED RELEASE TRANSDERMAL at 08:30

## 2022-03-03 RX ADMIN — ACETAMINOPHEN 650 MG: 325 TABLET ORAL at 08:29

## 2022-03-03 RX ADMIN — OXYCODONE HYDROCHLORIDE AND ACETAMINOPHEN 1 TABLET: 7.5; 325 TABLET ORAL at 00:08

## 2022-03-03 RX ADMIN — HYDROCHLOROTHIAZIDE 25 MG: 25 TABLET ORAL at 08:29

## 2022-03-03 RX ADMIN — FAMOTIDINE 20 MG: 20 TABLET, FILM COATED ORAL at 08:29

## 2022-03-03 RX ADMIN — OXYCODONE HYDROCHLORIDE AND ACETAMINOPHEN 1 TABLET: 7.5; 325 TABLET ORAL at 15:00

## 2022-03-03 RX ADMIN — IBUPROFEN 600 MG: 600 TABLET ORAL at 15:01

## 2022-03-03 RX ADMIN — OXYCODONE HYDROCHLORIDE AND ACETAMINOPHEN 1 TABLET: 7.5; 325 TABLET ORAL at 09:49

## 2022-03-03 RX ADMIN — IBUPROFEN 600 MG: 600 TABLET ORAL at 08:29

## 2022-03-03 RX ADMIN — OXYCODONE HYDROCHLORIDE AND ACETAMINOPHEN 1 TABLET: 7.5; 325 TABLET ORAL at 05:33

## 2022-03-03 RX ADMIN — ACETAMINOPHEN 650 MG: 325 TABLET ORAL at 15:01

## 2022-03-21 DIAGNOSIS — M48.062 LUMBAR STENOSIS WITH NEUROGENIC CLAUDICATION: ICD-10-CM

## 2022-03-21 NOTE — TELEPHONE ENCOUNTER
Provider:  Pepe  Caller:  Patient request refill  Surgery: LUMBAR FUSION DECOMPRESSON WITH PEDICLE SCREWS, EXTEND L4-S1 FUSION TO L3   Surgery Date:  02/28/2022  Last visit:  Office Visit with Ronald Cantu MD (01/04/2022)  Next visit: 03/2/2022    Reason for call:     Patient LVM requesting a refill on Percocet.   Medication pending.         Requested Prescriptions     Pending Prescriptions Disp Refills   • oxyCODONE-acetaminophen (PERCOCET) 7.5-325 MG per tablet 40 tablet 0     Sig: Take 1 tablet by mouth 3 (Three) Times a Day As Needed (Pain).     SIENNA:    11/17/2021 Oxycodone Hydrochloride 5MG 1980 25 4 Hugo Perdomo [facility] [address] 47 2  02/11/2022 Hydrocodone Bitartrate/Ac 325MG/5MG 1980 20 10 Brisa Griffith [facility] [address] 10 2  03/03/2022 Oxycodone/Acetaminophen  325MG/7.5MG  1980 40 14 Ronald Cantu [facility]  [facility] 32 1

## 2022-03-22 RX ORDER — OXYCODONE AND ACETAMINOPHEN 7.5; 325 MG/1; MG/1
1 TABLET ORAL 3 TIMES DAILY PRN
Qty: 40 TABLET | Refills: 0 | OUTPATIENT
Start: 2022-03-22

## 2022-03-23 ENCOUNTER — OFFICE VISIT (OUTPATIENT)
Dept: NEUROSURGERY | Facility: CLINIC | Age: 42
End: 2022-03-23

## 2022-03-23 ENCOUNTER — HOSPITAL ENCOUNTER (OUTPATIENT)
Dept: GENERAL RADIOLOGY | Facility: HOSPITAL | Age: 42
Discharge: HOME | End: 2022-03-23
Admitting: NEUROLOGICAL SURGERY

## 2022-03-23 VITALS
WEIGHT: 202.6 LBS | BODY MASS INDEX: 32.56 KG/M2 | TEMPERATURE: 97.1 F | HEART RATE: 112 BPM | OXYGEN SATURATION: 100 % | HEIGHT: 66 IN

## 2022-03-23 DIAGNOSIS — M48.062 LUMBAR STENOSIS WITH NEUROGENIC CLAUDICATION: Primary | ICD-10-CM

## 2022-03-23 DIAGNOSIS — Z98.1 S/P LUMBAR FUSION: ICD-10-CM

## 2022-03-23 DIAGNOSIS — M48.062 LUMBAR STENOSIS WITH NEUROGENIC CLAUDICATION: ICD-10-CM

## 2022-03-23 PROCEDURE — 99024 POSTOP FOLLOW-UP VISIT: CPT | Performed by: PHYSICIAN ASSISTANT

## 2022-03-23 PROCEDURE — 72100 X-RAY EXAM L-S SPINE 2/3 VWS: CPT

## 2022-03-23 RX ORDER — OXYCODONE AND ACETAMINOPHEN 7.5; 325 MG/1; MG/1
1 TABLET ORAL 2 TIMES DAILY PRN
Qty: 30 TABLET | Refills: 0 | Status: SHIPPED | OUTPATIENT
Start: 2022-03-23 | End: 2022-04-12 | Stop reason: DRUGHIGH

## 2022-03-23 NOTE — PROGRESS NOTES
"Subjective     Chief Complaint: back and leg pain with stenosis at L3/4 above prior L4-S1 fusion. S/p fusion extension on 2/28/22    Patient ID: Mica Jacobson is a 42 y.o. female is here today for follow-up.    History of Present Illness  The patient is a 42 y.o. female who is an established patient of Dr. Arriola.  She had a lumbar fusion from L4-S1 in August 2012 for treatment of discogenic pain syndrome.  She had done very well until last summer 2021 when she began having new back pain radiating into bilateral lower extremities particularly with long periods of standing or protracted sitting.  Muscle relaxants and gabapentin gave only moderate relief as did physical therapy..  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  Epidural steroid injection also did not relieve her pain and she was scheduled for extension of her fusion to include the L3-4 level.  surgery was performed on 2/28/2022 and was without complication.  Previous hardware at L5 and S1 was able to be removed.   Postoperatively, patient had some postop pain and mild serosanguineous drainage from her incision.H&H was 9.9/29.9.  She was mobilized with physical therapy.  Pain was controlled by postoperative day 2 and her drain was removed.  She continued to improve with good relief of her leg pain and was discharged to home on postoperative day 3, 3/3/2022. Today is her first follow up visit. She does have xrays from today for review.     Today, she states that she has continued to have incisional pain and muscle spasms in her back and has been struggling with this.  She is going to physical therapy outpatient once or twice a week and has been doing well.  She has had excellent relief of the nerve pain in her legs.  All of her pain is in her back and around her incision and she describes this as a \"constant burning or soreness\" this is worse with shifting.  She denies any drainage or " swelling of her incision.  She did have a low-grade fever yesterday of 99 but otherwise has not had fevers or night sweats.  She had been taking her Percocet as often as prescribed initially but then was taking half a tablet twice a day.  She is out of this pain medication and would like to be able to continue it if possible.  She does have Flexeril at home but has not been taking it regularly.  She has been walking regularly within the house and feels like her leg strength is improving.    The following portions of the patient's history were reviewed and updated as appropriate: allergies, current medications, past family history, past medical history, past social history, past surgical history and problem list.    Family history:   Family History   Problem Relation Age of Onset   • Heart disease Father         heart attacks   • Hypertension Father    • Diabetes Brother    • Hypertension Brother    • Diabetes Maternal Grandmother    • Diabetes Maternal Grandfather        Social history:   Social History     Socioeconomic History   • Marital status: Significant Other   Tobacco Use   • Smoking status: Former Smoker     Packs/day: 0.50     Types: Cigarettes     Quit date:      Years since quittin.2   • Smokeless tobacco: Never Used   • Tobacco comment: Currently trying to quit, 2021   Vaping Use   • Vaping Use: Never used   Substance and Sexual Activity   • Alcohol use: Yes     Comment: Rare, 1 or 2 drinks per week   • Drug use: Never   • Sexual activity: Defer     Birth control/protection: Patch       Review of Systems   Constitutional: Negative for activity change, appetite change, chills, diaphoresis, fatigue, fever and unexpected weight change.   HENT: Negative for congestion, dental problem, drooling, ear discharge, ear pain, facial swelling, hearing loss, mouth sores, nosebleeds, postnasal drip, rhinorrhea, sinus pressure, sinus pain, sneezing, sore throat, tinnitus, trouble swallowing and voice  "change.    Eyes: Negative for photophobia, pain, discharge, redness, itching and visual disturbance.   Respiratory: Negative for apnea, cough, choking, chest tightness, shortness of breath, wheezing and stridor.    Cardiovascular: Negative for chest pain, palpitations and leg swelling.   Gastrointestinal: Negative for abdominal distention, abdominal pain, anal bleeding, blood in stool, constipation, diarrhea, nausea, rectal pain and vomiting.   Endocrine: Negative for cold intolerance, heat intolerance, polydipsia, polyphagia and polyuria.   Genitourinary: Negative for decreased urine volume, difficulty urinating, dysuria, enuresis, flank pain, frequency, genital sores, hematuria and urgency.   Musculoskeletal: Positive for back pain. Negative for arthralgias, gait problem, joint swelling, myalgias, neck pain and neck stiffness.   Skin: Negative for color change, pallor, rash and wound.   Allergic/Immunologic: Negative for environmental allergies, food allergies and immunocompromised state.   Neurological: Negative for dizziness, tremors, seizures, syncope, facial asymmetry, speech difficulty, weakness, light-headedness, numbness and headaches.   Hematological: Negative for adenopathy. Does not bruise/bleed easily.   Psychiatric/Behavioral: Negative for agitation, behavioral problems, confusion, decreased concentration, dysphoric mood, hallucinations, self-injury, sleep disturbance and suicidal ideas. The patient is not nervous/anxious and is not hyperactive.    All other systems reviewed and are negative.      Objective   Pulse 112, temperature 97.1 °F (36.2 °C), height 167.6 cm (65.98\"), weight 91.9 kg (202 lb 9.6 oz), SpO2 100 %, not currently breastfeeding.  Body mass index is 32.72 kg/m².    Physical Exam  Constitutional:       General: She is not in acute distress.     Appearance: Normal appearance. She is diaphoretic. She is not toxic-appearing.      Comments: In pain with no distress, mildly diaphoretic "   HENT:      Head: Normocephalic.      Nose: Nose normal.   Eyes:      Extraocular Movements: Extraocular movements intact.      Pupils: Pupils are equal, round, and reactive to light.   Cardiovascular:      Pulses: Normal pulses.   Pulmonary:      Effort: Pulmonary effort is normal.      Breath sounds: No wheezing.   Musculoskeletal:      Comments: Gait is slow and antalgic.  She uses a walker to help with stabilization but is able to transition independently and take a few steps without it.  Lower extremity strength is 5 out of 5 and equal bilaterally   Skin:     Comments: Lumbar incision is clean, dry and intact.  She has some reactivity of her skin where the Mastisol had been applied, but this is mild.  The incision line itself is healing well.  There is no focal swelling or erythema, but there is generalized tenderness across her upper back and into her upper buttocks bilaterally   Neurological:      General: No focal deficit present.      Mental Status: She is alert and oriented to person, place, and time.      Motor: No weakness.      Coordination: Coordination normal.   Psychiatric:         Mood and Affect: Mood normal.         Behavior: Behavior normal.     AP and lateral x-rays of the lumbar spine show excellent placement of the fusion hardware at L3-4.  Previous hardware at L5 and S1 has been removed compared to prior studies    Assessment/Plan   Ms Jacobson has had good relief of her bilateral lower extremity pain since extension of her fusion to include L3-4 and removal of prior hardware.  She is still struggling with postop pain in her back, however.  Much of this per her description sounds like muscular pain.  She is instructed to take Flexeril 3 times a day regularly for the next couple of weeks and we will renew her Percocet at a dose of 5/325 twice daily as needed for severe pain.  She will continue physical therapy.  Her incision currently appears to be healing very well with no evidence of  swelling or infection.  She will keep us updated however if she develops a fever or if the appearance of her incision changes.  We will plan to have her follow-up with the PA in approximately 3 weeks for reevaluation, sooner as needed    Diagnoses and all orders for this visit:    1. Lumbar stenosis with neurogenic claudication (Primary)    2. S/P lumbar fusion        Return in about 3 weeks (around 4/13/2022).     Kimberly Agarwal PA-C

## 2022-03-23 NOTE — TELEPHONE ENCOUNTER
Provider:  Higinio  Caller:  ERICK  Surgery:  LUMBAR FUSION DECOMPRESSON WITH PEDICLE SCREWS, EXTEND L4-S1 FUSION TO L3  Surgery Date:  02/28/2022  Last visit:  Office Visit with Kimberly Agarwal PA-C (03/23/2022)  Next visit: 04/12/2022    Reason for call:         Per MANDA Jones request, Percocet 7.5-325 pending. Routing to Dr. Cantu to sign.     Requested Prescriptions     Pending Prescriptions Disp Refills   • oxyCODONE-acetaminophen (PERCOCET) 7.5-325 MG per tablet 30 tablet 0     Sig: Take 1 tablet by mouth 2 (Two) Times a Day As Needed for Moderate Pain  or Severe Pain  (Pain).         11/17/2021 Oxycodone Hydrochloride 5MG 1980 25 4 Hugo Perdomo [facility] [address] 47 2  02/11/2022 Hydrocodone Bitartrate/Ac 325MG/5MG 1980 20 10 Brisa Griffith [facility] [address] 10 2  03/03/2022 Oxycodone/Acetaminophen  325MG/7.5MG  1980 40 14 Ronald Cantu [facility]AIL  [facility]  [facility] 32 1

## 2022-04-12 ENCOUNTER — OFFICE VISIT (OUTPATIENT)
Dept: NEUROSURGERY | Facility: CLINIC | Age: 42
End: 2022-04-12

## 2022-04-12 DIAGNOSIS — M48.062 LUMBAR STENOSIS WITH NEUROGENIC CLAUDICATION: Primary | ICD-10-CM

## 2022-04-12 DIAGNOSIS — Z98.1 S/P LUMBAR FUSION: ICD-10-CM

## 2022-04-12 PROCEDURE — 99024 POSTOP FOLLOW-UP VISIT: CPT | Performed by: PHYSICIAN ASSISTANT

## 2022-04-12 RX ORDER — TAMSULOSIN HYDROCHLORIDE 0.4 MG/1
CAPSULE ORAL
COMMUNITY
Start: 2022-04-07 | End: 2022-05-21

## 2022-04-12 RX ORDER — OXYBUTYNIN CHLORIDE 10 MG/1
TABLET, EXTENDED RELEASE ORAL
COMMUNITY
Start: 2022-04-07 | End: 2022-05-21

## 2022-04-12 RX ORDER — OXYCODONE HYDROCHLORIDE AND ACETAMINOPHEN 5; 325 MG/1; MG/1
TABLET ORAL
COMMUNITY
Start: 2022-04-07 | End: 2022-05-21

## 2022-04-12 RX ORDER — CIPROFLOXACIN 250 MG/1
TABLET, FILM COATED ORAL
COMMUNITY
Start: 2022-04-07 | End: 2022-05-21

## 2022-04-12 NOTE — PROGRESS NOTES
Subjective     Chief Complaint:  back and leg pain with stenosis at L3/4 above prior L4-S1 fusion. S/p fusion extension on 2/28/22    Patient ID: Mica Jacobson is a 42 y.o. female is here today for follow-up.    History of Present Illness    The patient is a 42 y.o. female who is an established patient of Dr. Arriola.  She had a lumbar fusion from L4-S1 in August 2012 for treatment of discogenic pain syndrome.  She had done very well until summer up to 3 times a day when she began having new back pain radiating into bilateral lower extremities particularly with long periods of standing or protracted sitting.   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 After failing physical therapy, she ultimately underwent fusion extension to L3/4 on 2/28/22    Her first follow up visit was on 3/23/22. She was still having quite a bit of postop incisional pain and her Percocet was renewed.  She was also given Flexeril up to 3 times a day for muscle spasms.  She has continued with physical therapy.  Postop x-rays were reviewed at this appointment and her fusion construct looked very good.    Today, she states that her muscle spasms are decreasing significantly and she is increasing her activity and walking.  She can only take the Flexeril at night because it is too sedating during the day.  She has finished her Percocet and is off of these except for an episode last week with kidney stones.  She had stenting and lithotripsy last Thursday, 3/31/2022 and has a follow-up appointment for stent removal today.  From a fusion standpoint she is encouraged by her progress.  She has not been to physical therapy due to her urology issues but plans to go back starting later this week      The following portions of the patient's history were reviewed and updated as appropriate: allergies, current medications, past family history, past medical history, past social history, past  surgical history and problem list.    Family history:   Family History   Problem Relation Age of Onset   • Heart disease Father         heart attacks   • Hypertension Father    • Diabetes Brother    • Hypertension Brother    • Diabetes Maternal Grandmother    • Diabetes Maternal Grandfather        Social history:   Social History     Socioeconomic History   • Marital status: Significant Other   Tobacco Use   • Smoking status: Former Smoker     Packs/day: 0.50     Types: Cigarettes     Quit date:      Years since quittin.2   • Smokeless tobacco: Never Used   • Tobacco comment: Currently trying to quit, 2021   Vaping Use   • Vaping Use: Never used   Substance and Sexual Activity   • Alcohol use: Yes     Comment: Rare, 1 or 2 drinks per week   • Drug use: Never   • Sexual activity: Defer     Birth control/protection: Patch       Review of Systems   Constitutional: Negative for activity change, appetite change, chills, diaphoresis, fatigue, fever and unexpected weight change.   HENT: Negative for congestion, dental problem, drooling, ear discharge, ear pain, facial swelling, hearing loss, mouth sores, nosebleeds, postnasal drip, rhinorrhea, sinus pressure, sinus pain, sneezing, sore throat, tinnitus, trouble swallowing and voice change.    Eyes: Negative for photophobia, pain, discharge, redness, itching and visual disturbance.   Respiratory: Negative for apnea, cough, choking, chest tightness, shortness of breath, wheezing and stridor.    Cardiovascular: Negative for chest pain, palpitations and leg swelling.   Gastrointestinal: Negative for abdominal distention, abdominal pain, anal bleeding, blood in stool, constipation, diarrhea, nausea, rectal pain and vomiting.   Endocrine: Negative for cold intolerance, heat intolerance, polydipsia, polyphagia and polyuria.   Genitourinary: Negative for decreased urine volume, difficulty urinating, dyspareunia, dysuria, enuresis, flank pain, frequency, genital  sores, hematuria, menstrual problem, pelvic pain, urgency, vaginal bleeding, vaginal discharge and vaginal pain.   Musculoskeletal: Positive for myalgias. Negative for arthralgias, back pain, gait problem, joint swelling, neck pain and neck stiffness.   Skin: Negative for color change, pallor, rash and wound.   Allergic/Immunologic: Negative for environmental allergies, food allergies and immunocompromised state.   Neurological: Negative for dizziness, tremors, seizures, syncope, facial asymmetry, speech difficulty, weakness, light-headedness, numbness and headaches.   Hematological: Negative for adenopathy. Does not bruise/bleed easily.   Psychiatric/Behavioral: Negative for agitation, behavioral problems, confusion, decreased concentration, dysphoric mood, hallucinations, self-injury, sleep disturbance and suicidal ideas. The patient is not nervous/anxious and is not hyperactive.        Objective   not currently breastfeeding.  There is no height or weight on file to calculate BMI.    Physical Exam  Constitutional:       General: She is not in acute distress.      Comments: Appears comfortable and not in acute pain.   Cardiovascular:      Pulses: Normal pulses.   Pulmonary:      Effort: Pulmonary effort is normal.      Breath sounds: No wheezing.   Musculoskeletal:      Comments: Gait is steady and independent.  She does have a cane to help with ambulation but is no longer using a walker.  Lower extremity strength is 5 out of 5 and equal bilaterally   Skin:     Comments: Lumbar incision is clean, dry and intact without swelling or erythema.  It is healing well   neurological:      General: No focal deficit present.      Motor: No weakness.      Coordination: Coordination normal.   Psychiatric:         Mood and Affect: Mood normal.         Behavior: Behavior normal.     Assessment/Plan   Ms. Jacobson is doing much better today.  She will continue with physical therapy.  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  She is currently off work through May 8.  We recommend that she continue with physical therapy and work on work hardening.  We are hoping that she will be able to go back on light duty of no lifting over 20 pounds and preferably half time schedule (she works four 10 hour days currently).  She will call toward the end of the month to discuss return to work plans and a letter from us regarding this    We will follow up with her in 6 months with x-rays, sooner as needed        Diagnoses and all orders for this visit:    1. Lumbar stenosis with neurogenic claudication (Primary)  -     XR Spine Lumbar 2 or 3 View; Future    2. S/P lumbar fusion  -     XR Spine Lumbar 2 or 3 View; Future        Return in about 6 months (around 10/12/2022).      Kimberly Agarwal PA-C

## 2022-04-27 ENCOUNTER — TELEPHONE (OUTPATIENT)
Dept: NEUROSURGERY | Facility: CLINIC | Age: 42
End: 2022-04-27

## 2022-04-27 NOTE — TELEPHONE ENCOUNTER
Letter in patient's chart. I have notified patient and she will  in office tomorrow (04/28/2022).

## 2022-04-27 NOTE — TELEPHONE ENCOUNTER
Caller: Mica Jacobson    Relationship: Self    Best call back number: [phone]  What form or medical record are you requesting: RTW LETTER    Who is requesting this form or medical record from you: PATIENT    How would you like to receive the form or medical records (pick-up, mail, fax):   Timeframe paperwork needed: ASAP          LAST OV NOTES 04/12/22 STATE:  Ms. Jacobson is doing much better today.  She will continue with physical therapy.  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  She is currently off work through May 8.  We recommend that she continue with physical therapy and work on work hardening.  We are hoping that she will be able to go back on light duty of no lifting over 20 pounds and preferably half time schedule (she works four 10 hour days currently).  She will call toward the end of the month to discuss return to work plans and a letter from us regarding this  MAY 9TH.

## 2022-04-27 NOTE — TELEPHONE ENCOUNTER
Provider:  Shiloh Agarwal   Caller: Mica   Time of call:  12:57 PM   Phone #:  [phone]  Surgery:  Surgery with Ronald Cantu MD (02/28/2022)    Last visit: Office Visit with Kimberly Agarwal PA-C (04/12/2022)      Next visit: Appointment with Natalia Madrigal PA-C (10/14/2022)         Reason for call:   See Encounter below    Ok to provide RTW letter with restrictions listed in LOV?

## 2022-04-28 NOTE — TELEPHONE ENCOUNTER
Patient came to office to  letter and she needs the following listed on the letter:    Return to work date 05/09, is this okay?  How long does she need to work only 5 hours per day, until next OV-10/14/2022?    I will write a new letter with the following information above. She would like the letter emailed to [email or web address].

## 2022-06-14 ENCOUNTER — DOCUMENTATION (OUTPATIENT)
Dept: NEUROSURGERY | Facility: CLINIC | Age: 42
End: 2022-06-14

## 2022-08-26 ENCOUNTER — TRANSCRIBE ORDERS (OUTPATIENT)
Dept: ADMINISTRATIVE | Facility: HOSPITAL | Age: 42
End: 2022-08-26

## 2022-08-26 DIAGNOSIS — R10.11 RIGHT UPPER QUADRANT PAIN: Primary | ICD-10-CM

## 2022-08-29 ENCOUNTER — HOSPITAL ENCOUNTER (OUTPATIENT)
Dept: ULTRASOUND IMAGING | Facility: HOSPITAL | Age: 42
Discharge: HOME | End: 2022-08-29
Admitting: INTERNAL MEDICINE

## 2022-08-29 DIAGNOSIS — R10.11 RIGHT UPPER QUADRANT PAIN: ICD-10-CM

## 2022-08-29 PROCEDURE — 76705 ECHO EXAM OF ABDOMEN: CPT

## 2022-09-19 ENCOUNTER — TELEPHONE (OUTPATIENT)
Dept: NEUROSURGERY | Facility: CLINIC | Age: 42
End: 2022-09-19

## 2022-09-19 NOTE — TELEPHONE ENCOUNTER
Provider: Pepe   Surgery/Procedure: Surgery with Ronald Cantu MD (02/28/2022)       Last visit:Office Visit with Kimberly Agarwal PA-C (04/12/2022)        Next visit: Appointment with Natalia Madrigal PA-C (10/14/2022)       Reason for call:  Pt LVM requesting an updated work letter increasing her hours to 40/week with the same weight restrictions. Ok to provide letter? If approved, Esmer could you please send updated letter to pt's MyChart?

## 2022-09-19 NOTE — TELEPHONE ENCOUNTER
"Previous letter states:     \"It is my medical opinion that Mica Jacobson may work 32 hours a week with a lifting limit of 20 pounds.  These restrictions will stay in place until her follow-up appointment on 10-14-22.\"    OK to provide updated letter releasing pt to work 40 hrs per week?   "

## 2022-09-19 NOTE — TELEPHONE ENCOUNTER
Maricel once clinical produces letter.  I can fax letter. Please find out where she wants it faxed.  Thank you.  [place]

## 2022-10-07 ENCOUNTER — APPOINTMENT (OUTPATIENT)
Dept: GENERAL RADIOLOGY | Facility: HOSPITAL | Age: 42
End: 2022-10-07

## 2022-10-07 ENCOUNTER — HOSPITAL ENCOUNTER (EMERGENCY)
Facility: HOSPITAL | Age: 42
Discharge: HOME | End: 2022-10-07
Attending: EMERGENCY MEDICINE | Admitting: EMERGENCY MEDICINE

## 2022-10-07 VITALS
TEMPERATURE: 98.7 F | OXYGEN SATURATION: 100 % | RESPIRATION RATE: 22 BRPM | HEART RATE: 111 BPM | SYSTOLIC BLOOD PRESSURE: 157 MMHG | WEIGHT: 198 LBS | HEIGHT: 67 IN | BODY MASS INDEX: 31.08 KG/M2 | DIASTOLIC BLOOD PRESSURE: 83 MMHG

## 2022-10-07 DIAGNOSIS — T50.905A ADVERSE EFFECT OF DRUG, INITIAL ENCOUNTER: Primary | ICD-10-CM

## 2022-10-07 DIAGNOSIS — J06.9 UPPER RESPIRATORY TRACT INFECTION, UNSPECIFIED TYPE: ICD-10-CM

## 2022-10-07 LAB
ALBUMIN SERPL-MCNC: 3.9 G/DL (ref 3.5–5.2)
ALBUMIN/GLOB SERPL: 1.2 G/DL
ALP SERPL-CCNC: 66 U/L (ref 39–117)
ALT SERPL W P-5'-P-CCNC: 13 U/L (ref 1–33)
ANION GAP SERPL CALCULATED.3IONS-SCNC: 10 MMOL/L (ref 5–15)
AST SERPL-CCNC: 16 U/L (ref 1–32)
BASOPHILS # BLD AUTO: 0.02 10*3/MM3 (ref 0–0.2)
BASOPHILS NFR BLD AUTO: 0.2 % (ref 0–1.5)
BILIRUB SERPL-MCNC: 0.3 MG/DL (ref 0–1.2)
BUN SERPL-MCNC: 11 MG/DL (ref 6–20)
BUN/CREAT SERPL: 16.9 (ref 7–25)
CALCIUM SPEC-SCNC: 9 MG/DL (ref 8.6–10.5)
CHLORIDE SERPL-SCNC: 101 MMOL/L (ref 98–107)
CO2 SERPL-SCNC: 22 MMOL/L (ref 22–29)
CREAT SERPL-MCNC: 0.65 MG/DL (ref 0.57–1)
DEPRECATED RDW RBC AUTO: 42.5 FL (ref 37–54)
EGFRCR SERPLBLD CKD-EPI 2021: 113.6 ML/MIN/1.73
EOSINOPHIL # BLD AUTO: 0.05 10*3/MM3 (ref 0–0.4)
EOSINOPHIL NFR BLD AUTO: 0.5 % (ref 0.3–6.2)
ERYTHROCYTE [DISTWIDTH] IN BLOOD BY AUTOMATED COUNT: 13 % (ref 12.3–15.4)
FLUAV RNA RESP QL NAA+PROBE: NOT DETECTED
FLUBV RNA RESP QL NAA+PROBE: NOT DETECTED
GLOBULIN UR ELPH-MCNC: 3.2 GM/DL
GLUCOSE SERPL-MCNC: 95 MG/DL (ref 65–99)
HCT VFR BLD AUTO: 37.1 % (ref 34–46.6)
HGB BLD-MCNC: 12.3 G/DL (ref 12–15.9)
HOLD SPECIMEN: NORMAL
IMM GRANULOCYTES # BLD AUTO: 0.02 10*3/MM3 (ref 0–0.05)
IMM GRANULOCYTES NFR BLD AUTO: 0.2 % (ref 0–0.5)
LIPASE SERPL-CCNC: 22 U/L (ref 13–60)
LYMPHOCYTES # BLD AUTO: 1.57 10*3/MM3 (ref 0.7–3.1)
LYMPHOCYTES NFR BLD AUTO: 15.2 % (ref 19.6–45.3)
MCH RBC QN AUTO: 30 PG (ref 26.6–33)
MCHC RBC AUTO-ENTMCNC: 33.2 G/DL (ref 31.5–35.7)
MCV RBC AUTO: 90.5 FL (ref 79–97)
MONOCYTES # BLD AUTO: 0.41 10*3/MM3 (ref 0.1–0.9)
MONOCYTES NFR BLD AUTO: 4 % (ref 5–12)
NEUTROPHILS NFR BLD AUTO: 79.9 % (ref 42.7–76)
NEUTROPHILS NFR BLD AUTO: 8.24 10*3/MM3 (ref 1.7–7)
NRBC BLD AUTO-RTO: 0 /100 WBC (ref 0–0.2)
NT-PROBNP SERPL-MCNC: 104.7 PG/ML (ref 0–450)
PLATELET # BLD AUTO: 202 10*3/MM3 (ref 140–450)
PMV BLD AUTO: 10.8 FL (ref 6–12)
POTASSIUM SERPL-SCNC: 4.2 MMOL/L (ref 3.5–5.2)
PROT SERPL-MCNC: 7.1 G/DL (ref 6–8.5)
QT INTERVAL: 374 MS
QTC INTERVAL: 462 MS
RBC # BLD AUTO: 4.1 10*6/MM3 (ref 3.77–5.28)
SARS-COV-2 RNA RESP QL NAA+PROBE: NOT DETECTED
SODIUM SERPL-SCNC: 133 MMOL/L (ref 136–145)
TROPONIN T SERPL-MCNC: <0.01 NG/ML (ref 0–0.03)
WBC NRBC COR # BLD: 10.31 10*3/MM3 (ref 3.4–10.8)
WHOLE BLOOD HOLD COAG: NORMAL
WHOLE BLOOD HOLD SPECIMEN: NORMAL

## 2022-10-07 PROCEDURE — 99283 EMERGENCY DEPT VISIT LOW MDM: CPT

## 2022-10-07 PROCEDURE — 85025 COMPLETE CBC W/AUTO DIFF WBC: CPT | Performed by: EMERGENCY MEDICINE

## 2022-10-07 PROCEDURE — 84484 ASSAY OF TROPONIN QUANT: CPT | Performed by: EMERGENCY MEDICINE

## 2022-10-07 PROCEDURE — 93005 ELECTROCARDIOGRAM TRACING: CPT | Performed by: EMERGENCY MEDICINE

## 2022-10-07 PROCEDURE — 93005 ELECTROCARDIOGRAM TRACING: CPT

## 2022-10-07 PROCEDURE — 87636 SARSCOV2 & INF A&B AMP PRB: CPT | Performed by: NURSE PRACTITIONER

## 2022-10-07 PROCEDURE — 96374 THER/PROPH/DIAG INJ IV PUSH: CPT

## 2022-10-07 PROCEDURE — 71045 X-RAY EXAM CHEST 1 VIEW: CPT

## 2022-10-07 PROCEDURE — 25010000002 DIAZEPAM PER 5 MG: Performed by: EMERGENCY MEDICINE

## 2022-10-07 PROCEDURE — 83690 ASSAY OF LIPASE: CPT | Performed by: EMERGENCY MEDICINE

## 2022-10-07 PROCEDURE — 83880 ASSAY OF NATRIURETIC PEPTIDE: CPT | Performed by: EMERGENCY MEDICINE

## 2022-10-07 PROCEDURE — 80053 COMPREHEN METABOLIC PANEL: CPT | Performed by: EMERGENCY MEDICINE

## 2022-10-07 PROCEDURE — 36415 COLL VENOUS BLD VENIPUNCTURE: CPT

## 2022-10-07 RX ORDER — DIAZEPAM 5 MG/ML
5 INJECTION, SOLUTION INTRAMUSCULAR; INTRAVENOUS ONCE
Status: COMPLETED | OUTPATIENT
Start: 2022-10-07 | End: 2022-10-07

## 2022-10-07 RX ORDER — ASPIRIN 81 MG/1
324 TABLET, CHEWABLE ORAL ONCE
Status: DISCONTINUED | OUTPATIENT
Start: 2022-10-07 | End: 2022-10-07 | Stop reason: HOSPADM

## 2022-10-07 RX ORDER — SODIUM CHLORIDE 0.9 % (FLUSH) 0.9 %
10 SYRINGE (ML) INJECTION AS NEEDED
Status: DISCONTINUED | OUTPATIENT
Start: 2022-10-07 | End: 2022-10-07 | Stop reason: HOSPADM

## 2022-10-07 RX ADMIN — SODIUM CHLORIDE 1000 ML: 9 INJECTION, SOLUTION INTRAVENOUS at 14:44

## 2022-10-07 RX ADMIN — DIAZEPAM 5 MG: 5 INJECTION, SOLUTION INTRAMUSCULAR; INTRAVENOUS at 14:44

## 2022-10-07 NOTE — ED PROVIDER NOTES
Subjective   History of Present Illness  Pleasant patient comes into the ER for cough sinus pressure.  She reports she took some guaifenesin with dextromethorphan and shortly afterwards started to feel restless and jittery.  She feels much better now.  She denies any chest pain.  She tells me at one point she felt her throat was closing but this has resolved.  She denies any chance of being pregnant.    URI  Presenting symptoms: congestion and cough    Presenting symptoms: no fever and no sore throat    Severity:  Moderate  Onset quality:  Gradual  Duration: several days.  Timing:  Constant  Progression:  Improving  Chronicity:  New  Relieved by:  Nothing  Worsened by:  Nothing  Associated symptoms: sinus pain    Associated symptoms: no wheezing    Risk factors: sick contacts        Review of Systems   Constitutional: Negative for chills, diaphoresis and fever.   HENT: Positive for congestion and sinus pain. Negative for sore throat.    Respiratory: Positive for cough. Negative for choking, chest tightness, shortness of breath and wheezing.    Cardiovascular: Negative for chest pain and leg swelling.   Gastrointestinal: Negative for abdominal distention, abdominal pain, anal bleeding, blood in stool, constipation, diarrhea, nausea and vomiting.   Genitourinary: Negative for difficulty urinating, dysuria, flank pain, frequency, hematuria and urgency.   All other systems reviewed and are negative.      Past Medical History:   Diagnosis Date   • Abnormal Pap smear of cervix    • Back problem    • Breast cyst age 16    one breast is smaller   • Calculus of kidney h/o 2010 2016-passed 1 and still have 2-started QD water pill. She passed stone 11/25/19-brought in stone   • GERD (gastroesophageal reflux disease)    • Hx of degenerative disc disease    • Ovarian cyst     ruptured 2013, small present on CT 7/2016   • Papanicolaou smear 11/19/2018    reviewed 11/25/2019-negative   • Screening breast examination     self  admits       Allergies   Allergen Reactions   • Shellfish-Derived Products Anaphylaxis and Hives   • Cymbalta [Duloxetine Hcl] Dizziness     Dry mouth weakness, headache, nausea, and drowiness   • Tizanidine Other (See Comments)     Pt states severe dizziness, drowsiness, weakness, severe dry mouth, headache, nausea    • Steri-Strip Compound Benzoin [Benzoin Compound] Rash       Past Surgical History:   Procedure Laterality Date   • CARPAL TUNNEL RELEASE Bilateral 2018   • CHOLECYSTECTOMY     • COLPOSCOPY     • KIDNEY STONE SURGERY     • KIDNEY STONE SURGERY     • LUMBAR LAMINECTOMY WITH FUSION N/A 2022    Procedure: LUMBAR FUSION DECOMPRESSON WITH PEDICLE SCREWS, EXTEND L4-S1 FUSION TO L3;  Surgeon: Ronald Cantu MD;  Location: [facility];  Service: Neurosurgery;  Laterality: N/A;   • OTHER SURGICAL HISTORY  age 20    laser of cervix   • SPINAL FUSION  2012    DDD   • TENNIS ELBOW RELEASE  2021   • WISDOM TOOTH EXTRACTION      2017       Family History   Problem Relation Age of Onset   • Heart disease Father         heart attacks   • Hypertension Father    • Diabetes Brother    • Hypertension Brother    • Diabetes Maternal Grandmother    • Diabetes Maternal Grandfather        Social History     Socioeconomic History   • Marital status: Significant Other   Tobacco Use   • Smoking status: Former Smoker     Packs/day: 0.50     Types: Cigarettes     Quit date:      Years since quittin.7   • Smokeless tobacco: Never Used   • Tobacco comment: Currently trying to quit, 2021   Vaping Use   • Vaping Use: Never used   Substance and Sexual Activity   • Alcohol use: Yes     Comment: Rare, 1 or 2 drinks per week   • Drug use: Never   • Sexual activity: Defer     Birth control/protection: Patch           Objective   Physical Exam  Constitutional:       Appearance: She is well-developed.   HENT:      Head: Normocephalic and atraumatic.      Right Ear: External ear normal.      Left Ear:  External ear normal.      Nose: Nose normal.   Eyes:      Conjunctiva/sclera: Conjunctivae normal.      Pupils: Pupils are equal, round, and reactive to light.   Cardiovascular:      Rate and Rhythm: Normal rate and regular rhythm.      Heart sounds: Normal heart sounds.   Pulmonary:      Effort: Pulmonary effort is normal.      Breath sounds: Normal breath sounds.   Abdominal:      General: Bowel sounds are normal.      Palpations: Abdomen is soft.   Musculoskeletal:         General: Normal range of motion.      Cervical back: Normal range of motion and neck supple.   Skin:     General: Skin is warm and dry.   Neurological:      Mental Status: She is alert and oriented to person, place, and time.   Psychiatric:         Behavior: Behavior normal.         Thought Content: Thought content normal.         Judgment: Judgment normal.         Procedures           ED Course  ED Course as of 10/07/22 1559   Fri Oct 07, 2022   1559 Patient resting comfortably.  Feels much better.  Ready to go home. [JM]      ED Course User Index  [JM] Edwin Nick APRN                XR Chest 1 View   Final Result   No evidence of active chest disease.       This report was finalized on 10/7/2022 1:22 PM by Dr. Marcelino Lo MD.                                       [facility]    Final diagnoses:   Adverse effect of drug, initial encounter   Upper respiratory tract infection, unspecified type       ED Disposition  ED Disposition     ED Disposition   Discharge    Condition   Stable    Comment   --             Brisa Griffith MD  [address] [id number] [phone]    Schedule an appointment as soon as possible for a visit            Medication List      Changed    Xulane 150-35 MCG/24HR  Generic drug: norelgestromin-ethinyl estradiol  Place 1 patch on the skin as directed by provider 1 (One) Time Per Week.  What changed: additional instructions             Edwin Nick APRN  10/07/22 1559

## 2022-10-14 ENCOUNTER — HOSPITAL ENCOUNTER (OUTPATIENT)
Dept: GENERAL RADIOLOGY | Facility: HOSPITAL | Age: 42
Discharge: HOME | End: 2022-10-14
Admitting: PHYSICIAN ASSISTANT

## 2022-10-14 ENCOUNTER — OFFICE VISIT (OUTPATIENT)
Dept: NEUROSURGERY | Facility: CLINIC | Age: 42
End: 2022-10-14

## 2022-10-14 VITALS — TEMPERATURE: 96.8 F | RESPIRATION RATE: 16 BRPM | HEIGHT: 67 IN | BODY MASS INDEX: 31.45 KG/M2 | WEIGHT: 200.4 LBS

## 2022-10-14 DIAGNOSIS — M54.9 MECHANICAL BACK PAIN: ICD-10-CM

## 2022-10-14 DIAGNOSIS — Z98.1 S/P LUMBAR FUSION: ICD-10-CM

## 2022-10-14 DIAGNOSIS — M48.062 SPINAL STENOSIS OF LUMBAR REGION WITH NEUROGENIC CLAUDICATION: ICD-10-CM

## 2022-10-14 DIAGNOSIS — M48.062 LUMBAR STENOSIS WITH NEUROGENIC CLAUDICATION: ICD-10-CM

## 2022-10-14 DIAGNOSIS — M48.062 LUMBAR STENOSIS WITH NEUROGENIC CLAUDICATION: Primary | ICD-10-CM

## 2022-10-14 PROCEDURE — 99213 OFFICE O/P EST LOW 20 MIN: CPT | Performed by: PHYSICIAN ASSISTANT

## 2022-10-14 PROCEDURE — 72100 X-RAY EXAM L-S SPINE 2/3 VWS: CPT

## 2022-10-14 RX ORDER — IBUPROFEN 800 MG/1
800 TABLET ORAL EVERY 8 HOURS PRN
Qty: 90 TABLET | Refills: 0 | Status: SHIPPED | OUTPATIENT
Start: 2022-10-14 | End: 2022-11-29

## 2022-10-14 NOTE — PROGRESS NOTES
Patient: Mica Jacobson  : 1980  Chart #: [id number]    Date of Service: 10/14/2022    CHIEF COMPLAINT: Back and leg pain with neurogenic claudication    History of Present Illness Patient is a 42-year-old woman who is seen in follow-up.  Her history significant for undergoing lumbar fusion L4-S1 in 2012 with Dr. Cantu.  She did very well until last summer when she developed new back pain radiating into the bilateral lower extremities-worse with prolonged standing and walking.  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  Ultimately on 2022 she underwent fusion extension to the L3-4 level.  She admits this recovery has been a bit harder than her first go around however she is showing improvements all the time.  She no longer has leg pain.  She does continue with discomfort in the low back she is back to doing her normal and exercising and stretching.  Still, she requires an ibuprofen in the evenings due to the low back pain.  She has no new complaints today      Past Medical History:   Diagnosis Date   • Abnormal Pap smear of cervix    • Back problem    • Breast cyst age 16    one breast is smaller   • Calculus of kidney h/o 2010-passed 1 and still have 2-started QD water pill. She passed stone 19-brought in stone   • GERD (gastroesophageal reflux disease)    • Hx of degenerative disc disease    • Ovarian cyst     ruptured , small present on CT 2016   • Papanicolaou smear 2018    reviewed 2019-negative   • Screening breast examination     self admits         Current Outpatient Medications:   •  ibuprofen (ADVIL,MOTRIN) 800 MG tablet, Take 1 tablet by mouth Every 8 (Eight) Hours As Needed for Mild Pain., Disp: 90 tablet, Rfl: 0  •  Acetaminophen (Tylenol) 325 MG capsule, Tylenol, Disp: , Rfl:   •  amoxicillin (AMOXIL) 500 MG capsule, Take 2 capsules by mouth 2 (Two) Times a Day., Disp: 40 capsule, Rfl: 0  •  cyclobenzaprine (FLEXERIL) 5  MG tablet, Take 5 mg by mouth Every 8 (Eight) Hours As Needed for Muscle Spasms. 1-2 tablets every 8 hours prn, Disp: , Rfl:   •  cyclobenzaprine (FLEXERIL) 5 MG tablet, Every 8 (Eight) Hours., Disp: , Rfl:   •  hydroCHLOROthiazide (HYDRODIURIL) 25 MG tablet, Take 25 mg by mouth Daily., Disp: , Rfl:   •  Xulane 150-35 MCG/24HR, Place 1 patch on the skin as directed by provider 1 (One) Time Per Week. (Patient taking differently: Place 1 patch on the skin as directed by provider 1 (One) Time Per Week. Currently on RLQ of abdomen.), Disp: 3 patch, Rfl: 12    Past Surgical History:   Procedure Laterality Date   • CARPAL TUNNEL RELEASE Bilateral 2018   • CHOLECYSTECTOMY     • COLPOSCOPY     • KIDNEY STONE SURGERY     • KIDNEY STONE SURGERY     • LUMBAR LAMINECTOMY WITH FUSION N/A 2022    Procedure: LUMBAR FUSION DECOMPRESSON WITH PEDICLE SCREWS, EXTEND L4-S1 FUSION TO L3;  Surgeon: Ronald Cantu MD;  Location: [facility];  Service: Neurosurgery;  Laterality: N/A;   • OTHER SURGICAL HISTORY  age 20    laser of cervix   • SPINAL FUSION  2012    DDD   • TENNIS ELBOW RELEASE  2021   • WISDOM TOOTH EXTRACTION             Social History     Socioeconomic History   • Marital status: Significant Other   Tobacco Use   • Smoking status: Former     Packs/day: 0.50     Types: Cigarettes     Quit date:      Years since quittin.7   • Smokeless tobacco: Never   • Tobacco comments:     Currently trying to quit, 2021   Vaping Use   • Vaping Use: Never used   Substance and Sexual Activity   • Alcohol use: Yes     Comment: Rare, 1 or 2 drinks per week   • Drug use: Never   • Sexual activity: Defer     Birth control/protection: Patch         Review of Systems   Constitutional: Negative for activity change, appetite change, chills, diaphoresis, fatigue, fever and unexpected weight change.   HENT: Negative for congestion, dental problem, drooling, ear discharge, ear pain, facial swelling, hearing  "loss, mouth sores, nosebleeds, postnasal drip, rhinorrhea, sinus pressure, sneezing, sore throat, tinnitus, trouble swallowing and voice change.    Eyes: Negative for photophobia, pain, discharge, redness, itching and visual disturbance.   Respiratory: Negative for apnea, cough, choking, chest tightness, shortness of breath, wheezing and stridor.    Cardiovascular: Negative for chest pain, palpitations and leg swelling.   Gastrointestinal: Negative for abdominal distention, abdominal pain, anal bleeding, blood in stool, constipation, diarrhea, nausea, rectal pain and vomiting.   Endocrine: Negative for cold intolerance, heat intolerance, polydipsia, polyphagia and polyuria.   Genitourinary: Negative for decreased urine volume, difficulty urinating, dysuria, enuresis, flank pain, frequency, genital sores, hematuria and urgency.   Musculoskeletal: Negative for arthralgias, back pain, gait problem, joint swelling, myalgias, neck pain and neck stiffness.   Skin: Negative for color change, pallor, rash and wound.   Allergic/Immunologic: Negative for environmental allergies, food allergies and immunocompromised state.   Neurological: Negative for dizziness, tremors, seizures, syncope, facial asymmetry, speech difficulty, weakness, light-headedness, numbness and headaches.   Hematological: Negative for adenopathy. Does not bruise/bleed easily.   Psychiatric/Behavioral: Negative for agitation, behavioral problems, confusion, decreased concentration, dysphoric mood, hallucinations, self-injury, sleep disturbance and suicidal ideas. The patient is not nervous/anxious and is not hyperactive.    All other systems reviewed and are negative.      Objective   Vital Signs: Temperature 96.8 °F (36 °C), temperature source Infrared, resp. rate 16, height 170.2 cm (67\"), weight 90.9 kg (200 lb 6.4 oz), not currently breastfeeding.  Physical Exam  Vitals and nursing note reviewed.   Constitutional:       General: She is not in acute " distress.     Appearance: She is well-developed.   Psychiatric:         Behavior: Behavior normal.         Thought Content: Thought content normal.     Musculoskeletal:     Strength is intact in upper and lower extremities to direct testing.     Station and gait are normal.  Neurologic:     Muscle tone is normal throughout.     Coordination is intact.     Sensation is intact to light touch throughout.     Patient is oriented to person, place, and time.         Independent review of radiographic imaging: Plain films of the lumbar spine from today's date demonstrate surgical construct intact with good alignment L3-4.  Previous postoperative changes with hardware removal L4-S1 noted.    Assessment & Plan   Diagnosis: Transition syndrome status post fusion extension to L3-4 from previous L4--S1 construct    Medical Decision Making: Overall patient is doing well.  She does harbor low back pain particularly in the evenings for which she will take an ibuprofen.  She is back to her normal exercises.  She will follow-up in another 6 months with another set of plain films.  Call the office in the interim with any questions or concerns.    Diagnoses and all orders for this visit:    1. Lumbar stenosis with neurogenic claudication (Primary)  -     XR Spine Lumbar AP & Lateral; Future    2. S/P lumbar fusion  -     XR Spine Lumbar AP & Lateral; Future    3. Mechanical back pain  -     XR Spine Lumbar AP & Lateral; Future    4. Spinal stenosis of lumbar region with neurogenic claudication  -     XR Spine Lumbar AP & Lateral; Future    Other orders  -     ibuprofen (ADVIL,MOTRIN) 800 MG tablet; Take 1 tablet by mouth Every 8 (Eight) Hours As Needed for Mild Pain.  Dispense: 90 tablet; Refill: 0                      Natalia Madrigal PA-C  Patient Care Team:  Brisa Griffith MD as PCP - General (General Practice)

## 2022-11-29 RX ORDER — IBUPROFEN 800 MG/1
TABLET ORAL
Qty: 90 TABLET | Refills: 0 | Status: SHIPPED | OUTPATIENT
Start: 2022-11-29

## 2022-11-29 NOTE — TELEPHONE ENCOUNTER
Provider:  Pepe  Surgery/Procedure:  Ext L4-S1 fusion to L3  Surgery/Procedure Date:  02/28/2022  Last visit:   10/14/2022  Next visit: 04/14/2023     Reason for call:  Requested Prescriptions     Pending Prescriptions Disp Refills   • ibuprofen (ADVIL,MOTRIN) 800 MG tablet [Pharmacy Med Name: IBUPROFEN 800 MG TABLET] 90 tablet 0     Sig: TAKE ONE TABLET BY MOUTH EVERY 8 HOURS AS NEEDED FOR MILD PAIN

## 2022-12-14 PROCEDURE — 87086 URINE CULTURE/COLONY COUNT: CPT | Performed by: PHYSICIAN ASSISTANT

## 2023-05-02 DIAGNOSIS — Z98.1 S/P LUMBAR FUSION: Primary | ICD-10-CM

## 2023-05-03 ENCOUNTER — OFFICE VISIT (OUTPATIENT)
Dept: NEUROSURGERY | Facility: CLINIC | Age: 43
End: 2023-05-03
Payer: COMMERCIAL

## 2023-05-03 ENCOUNTER — HOSPITAL ENCOUNTER (OUTPATIENT)
Dept: GENERAL RADIOLOGY | Facility: HOSPITAL | Age: 43
Discharge: HOME | End: 2023-05-03
Payer: COMMERCIAL

## 2023-05-03 VITALS
BODY MASS INDEX: 29.03 KG/M2 | DIASTOLIC BLOOD PRESSURE: 80 MMHG | TEMPERATURE: 97.5 F | WEIGHT: 185 LBS | HEIGHT: 67 IN | SYSTOLIC BLOOD PRESSURE: 118 MMHG

## 2023-05-03 DIAGNOSIS — Z98.1 S/P LUMBAR FUSION: ICD-10-CM

## 2023-05-03 DIAGNOSIS — M48.062 LUMBAR STENOSIS WITH NEUROGENIC CLAUDICATION: ICD-10-CM

## 2023-05-03 DIAGNOSIS — M54.9 MECHANICAL BACK PAIN: ICD-10-CM

## 2023-05-03 DIAGNOSIS — Z98.1 S/P LUMBAR FUSION: Primary | ICD-10-CM

## 2023-05-03 DIAGNOSIS — M48.062 SPINAL STENOSIS OF LUMBAR REGION WITH NEUROGENIC CLAUDICATION: ICD-10-CM

## 2023-05-03 PROCEDURE — 72100 X-RAY EXAM L-S SPINE 2/3 VWS: CPT

## 2023-05-03 RX ORDER — METHOCARBAMOL 750 MG/1
750 TABLET, FILM COATED ORAL 3 TIMES DAILY PRN
Qty: 60 TABLET | Refills: 2 | Status: SHIPPED | OUTPATIENT
Start: 2023-05-03

## 2023-05-03 RX ORDER — MELOXICAM 7.5 MG/1
TABLET ORAL
COMMUNITY
Start: 2023-04-16

## 2023-05-03 NOTE — PROGRESS NOTES
Patient: Mica Jacobson  : 1980  Chart #: [id number]    Date of Service: 5/3/2023    CHIEF COMPLAINT: Back and leg pain with neurogenic claudication    History of Present Illness Patient is a 43-year-old woman who works at the [employer]. She is seen in follow-up.  In 2012 she underwent lumbar fusion L4-S1 with Dr. Cantu.  She did very well until last summer when she developed new back pain radiating into the bilateral lower extremities-worse with prolonged standing and walking.  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  Ultimately on 2022 she underwent fusion extension to the L3-4 level.  She admits this recovery has been a bit harder than her first go around however she is showing improvements all the time.  She no longer has leg pain.  She does continue with discomfort in the low back she is back to doing her normal and exercising and stretching.  She takes meloxicam 7.5 mg daily but it does not seem to be helping as much as it once did.  Muscle relaxers are typically too sedating.  She will take Flexeril at bedtime.  She walks 6-9 miles at work daily.  She tries to avoid lifting.  She has been working on weight loss.     Remotely she had epidural injections but ultimately they did not provide lasting relief.    Past Medical History:   Diagnosis Date   • Abnormal Pap smear of cervix    • Back problem    • Breast cyst age 16    one breast is smaller   • Calculus of kidney h/o 2010-passed 1 and still have 2-started QD water pill. She passed stone 19-brought in stone   • GERD (gastroesophageal reflux disease)    • Hx of degenerative disc disease    • Ovarian cyst     ruptured , small present on CT 2016   • Papanicolaou smear 2018    reviewed 2019-negative   • Screening breast examination     self admits         Current Outpatient Medications:   •  Acetaminophen (Tylenol) 325 MG capsule, Tylenol, Disp: , Rfl:   •  cyclobenzaprine  (FLEXERIL) 5 MG tablet, Every 8 (Eight) Hours., Disp: , Rfl:   •  hydroCHLOROthiazide (HYDRODIURIL) 25 MG tablet, Take 1 tablet by mouth Daily., Disp: , Rfl:   •  ibuprofen (ADVIL,MOTRIN) 800 MG tablet, TAKE ONE TABLET BY MOUTH EVERY 8 HOURS AS NEEDED FOR MILD PAIN, Disp: 90 tablet, Rfl: 0  •  meloxicam (MOBIC) 7.5 MG tablet, , Disp: , Rfl:   •  Xulane 150-35 MCG/24HR, Place 1 patch on the skin as directed by provider 1 (One) Time Per Week. (Patient taking differently: Place 1 patch on the skin as directed by provider 1 (One) Time Per Week. Currently on RLQ of abdomen.), Disp: 3 patch, Rfl: 12  •  methocarbamol (ROBAXIN) 750 MG tablet, Take 1 tablet by mouth 3 (Three) Times a Day As Needed for Muscle Spasms., Disp: 60 tablet, Rfl: 2    Past Surgical History:   Procedure Laterality Date   • CARPAL TUNNEL RELEASE Bilateral 2018   • CHOLECYSTECTOMY     • COLPOSCOPY     • KIDNEY STONE SURGERY     • KIDNEY STONE SURGERY     • LUMBAR LAMINECTOMY WITH FUSION N/A 2022    Procedure: LUMBAR FUSION DECOMPRESSON WITH PEDICLE SCREWS, EXTEND L4-S1 FUSION TO L3;  Surgeon: Ronald Cantu MD;  [facility];  Service: Neurosurgery;  Laterality: N/A;   • OTHER SURGICAL HISTORY  age 20    laser of cervix   • SPINAL FUSION  2012    DDD   • TENNIS ELBOW RELEASE  2021   • WISDOM TOOTH EXTRACTION             Social History     Socioeconomic History   • Marital status: Significant Other   Tobacco Use   • Smoking status: Former     Packs/day: 0.50     Types: Cigarettes     Quit date:      Years since quittin.3   • Smokeless tobacco: Never   • Tobacco comments:     Currently trying to quit, 2021   Vaping Use   • Vaping Use: Never used   Substance and Sexual Activity   • Alcohol use: Yes     Comment: Rare, 1 or 2 drinks per week   • Drug use: Never   • Sexual activity: Defer     Birth control/protection: Patch         Review of Systems   Constitutional: Negative for activity change,  appetite change, chills, diaphoresis, fatigue, fever and unexpected weight change.   HENT: Negative for congestion, dental problem, drooling, ear discharge, ear pain, facial swelling, hearing loss, mouth sores, nosebleeds, postnasal drip, rhinorrhea, sinus pressure, sneezing, sore throat, tinnitus, trouble swallowing and voice change.    Eyes: Negative for photophobia, pain, discharge, redness, itching and visual disturbance.   Respiratory: Negative for apnea, cough, choking, chest tightness, shortness of breath, wheezing and stridor.    Cardiovascular: Negative for chest pain, palpitations and leg swelling.   Gastrointestinal: Negative for abdominal distention, abdominal pain, anal bleeding, blood in stool, constipation, diarrhea, nausea, rectal pain and vomiting.   Endocrine: Negative for cold intolerance, heat intolerance, polydipsia, polyphagia and polyuria.   Genitourinary: Negative for decreased urine volume, difficulty urinating, dysuria, enuresis, flank pain, frequency, genital sores, hematuria and urgency.   Musculoskeletal: Negative for arthralgias, back pain, gait problem, joint swelling, myalgias, neck pain and neck stiffness.   Skin: Negative for color change, pallor, rash and wound.   Allergic/Immunologic: Negative for environmental allergies, food allergies and immunocompromised state.   Neurological: Negative for dizziness, tremors, seizures, syncope, facial asymmetry, speech difficulty, weakness, light-headedness, numbness and headaches.   Hematological: Negative for adenopathy. Does not bruise/bleed easily.   Psychiatric/Behavioral: Negative for agitation, behavioral problems, confusion, decreased concentration, dysphoric mood, hallucinations, self-injury, sleep disturbance and suicidal ideas. The patient is not nervous/anxious and is not hyperactive.    All other systems reviewed and are negative.      Objective   Vital Signs: Blood pressure 118/80, temperature 97.5 °F (36.4 °C), temperature  "source Infrared, height 170.2 cm (67\"), weight 83.9 kg (185 lb), not currently breastfeeding.  Physical Exam  Vitals and nursing note reviewed.   Constitutional:       General: She is not in acute distress.     Appearance: She is well-developed.   Psychiatric:         Behavior: Behavior normal.         Thought Content: Thought content normal.   Musculoskeletal:     Strength is intact in upper and lower extremities to direct testing.     Station and gait are normal.  Neurologic:     Muscle tone is normal throughout.     Coordination is intact.     Sensation is intact to light touch throughout.     Patient is oriented to person, place, and time.         Independent review of radiographic imaging: Plain films of the lumbar spine from today's date demonstrate surgical construct intact with good alignment L3-4.  Previous postoperative changes with hardware removal L4-S1 noted.  May be slight progression of degenerative disc at L2-3 when compared to x-ray from 10/14/2022    Assessment & Plan   Diagnosis: Transition syndrome status post fusion extension to L3-4 from previous L4--S1 construct    Medical Decision Making: Overall patient is doing better.  She does harbor low back pain which is mechanical.  She takes meloxicam 7.5 mg daily but it does not seem to be working as well as it once did.  I told her she can double up on that taking 15 mg daily.  I also prescribed a new muscle relaxer that she can use as needed instead of Flexeril.  She will continue weight loss efforts.  If symptoms progress or become limiting she will notify our office and I will be happy to see her for reassessment.      Diagnoses and all orders for this visit:    1. S/P lumbar fusion (Primary)    2. Lumbar stenosis with neurogenic claudication    3. Mechanical back pain    4. Spinal stenosis of lumbar region with neurogenic claudication    Other orders  -     methocarbamol (ROBAXIN) 750 MG tablet; Take 1 tablet by mouth 3 (Three) Times a Day As " Needed for Muscle Spasms.  Dispense: 60 tablet; Refill: 2                      Natalia Madrigal PA-C  Patient Care Team:  Brisa Griffith MD as PCP - General (General Practice)

## 2023-07-20 ENCOUNTER — TRANSCRIBE ORDERS (OUTPATIENT)
Dept: ADMINISTRATIVE | Facility: HOSPITAL | Age: 43
End: 2023-07-20
Payer: COMMERCIAL

## 2023-07-20 DIAGNOSIS — R10.9 ABDOMINAL PAIN, UNSPECIFIED ABDOMINAL LOCATION: Primary | ICD-10-CM

## 2023-09-06 ENCOUNTER — TELEPHONE (OUTPATIENT)
Dept: NEUROSURGERY | Facility: CLINIC | Age: 43
End: 2023-09-06

## 2023-09-06 DIAGNOSIS — M54.9 MECHANICAL BACK PAIN: ICD-10-CM

## 2023-09-06 DIAGNOSIS — M48.062 LUMBAR STENOSIS WITH NEUROGENIC CLAUDICATION: ICD-10-CM

## 2023-09-06 DIAGNOSIS — Z98.1 S/P LUMBAR FUSION: Primary | ICD-10-CM

## 2023-09-06 NOTE — TELEPHONE ENCOUNTER
Caller: Mica Jacobson    Relationship: Self    Best call back number: [phone]    What is the medical concern/diagnosis: PT CALLED STATES SHE HAS CONSTANT BACK PAIN AND NERVE PAIN DOWN HER BUTTOX. SOME NUMBNESS IN LEFT LEG. NERVE PAIN STARTED A FEW MONTHS AGO OFF AND ON BUT BACK PAIN BEEN THERE EVER SINCE SURGERY.     What specialty or service is being requested: PAIN AMEE INJECTIONS    What is the provider, practice or medical service name:     What is the office location: [place]      Any additional details: PT STATES AT LAST OVN WITH [facility] SHE WAS INSTRUCTED TO CALL THE OFFICE IF SHE WANTED A PAIN AMEE REFERRAL FOR INJECTIONS.     PLEASE CALL PT ONCE REFERRAL HAS BEEN PLACED IN CHART.     THANK YOU,

## 2023-09-07 NOTE — TELEPHONE ENCOUNTER
Provider:  Kaitlin  Surgery/Procedure:  Lumbar fusion extended from L4-S1 to L3  Surgery/Procedure Date:  2/28/22  Last visit:   5/3/23  Next visit: NA     Reason for call:  Patient requesting referral for pain management for lower back pain and radiating nerve pain to buttock.

## 2023-10-20 ENCOUNTER — HOSPITAL ENCOUNTER (EMERGENCY)
Facility: HOSPITAL | Age: 43
Discharge: HOME | End: 2023-10-20
Attending: EMERGENCY MEDICINE
Payer: COMMERCIAL

## 2023-10-20 ENCOUNTER — APPOINTMENT (OUTPATIENT)
Dept: GENERAL RADIOLOGY | Facility: HOSPITAL | Age: 43
End: 2023-10-20
Payer: COMMERCIAL

## 2023-10-20 VITALS
BODY MASS INDEX: 29.03 KG/M2 | DIASTOLIC BLOOD PRESSURE: 83 MMHG | TEMPERATURE: 98.3 F | HEART RATE: 92 BPM | WEIGHT: 185 LBS | HEIGHT: 67 IN | SYSTOLIC BLOOD PRESSURE: 121 MMHG | RESPIRATION RATE: 16 BRPM | OXYGEN SATURATION: 100 %

## 2023-10-20 DIAGNOSIS — R00.2 PALPITATIONS: ICD-10-CM

## 2023-10-20 DIAGNOSIS — R07.9 CHEST PAIN, UNSPECIFIED TYPE: Primary | ICD-10-CM

## 2023-10-20 LAB
ALBUMIN SERPL-MCNC: 4.2 G/DL (ref 3.5–5.2)
ALBUMIN/GLOB SERPL: 1.3 G/DL
ALP SERPL-CCNC: 62 U/L (ref 39–117)
ALT SERPL W P-5'-P-CCNC: 9 U/L (ref 1–33)
ANION GAP SERPL CALCULATED.3IONS-SCNC: 10 MMOL/L (ref 5–15)
AST SERPL-CCNC: 14 U/L (ref 1–32)
BASOPHILS # BLD AUTO: 0.02 10*3/MM3 (ref 0–0.2)
BASOPHILS NFR BLD AUTO: 0.2 % (ref 0–1.5)
BILIRUB SERPL-MCNC: 0.2 MG/DL (ref 0–1.2)
BUN SERPL-MCNC: 16 MG/DL (ref 6–20)
BUN/CREAT SERPL: 21.9 (ref 7–25)
CALCIUM SPEC-SCNC: 9.5 MG/DL (ref 8.6–10.5)
CHLORIDE SERPL-SCNC: 101 MMOL/L (ref 98–107)
CO2 SERPL-SCNC: 27 MMOL/L (ref 22–29)
CREAT SERPL-MCNC: 0.73 MG/DL (ref 0.57–1)
DEPRECATED RDW RBC AUTO: 41.6 FL (ref 37–54)
EGFRCR SERPLBLD CKD-EPI 2021: 105.5 ML/MIN/1.73
EOSINOPHIL # BLD AUTO: 0.1 10*3/MM3 (ref 0–0.4)
EOSINOPHIL NFR BLD AUTO: 1.2 % (ref 0.3–6.2)
ERYTHROCYTE [DISTWIDTH] IN BLOOD BY AUTOMATED COUNT: 12.5 % (ref 12.3–15.4)
GEN 5 2HR TROPONIN T REFLEX: <6 NG/L
GLOBULIN UR ELPH-MCNC: 3.3 GM/DL
GLUCOSE SERPL-MCNC: 116 MG/DL (ref 65–99)
HCT VFR BLD AUTO: 39 % (ref 34–46.6)
HGB BLD-MCNC: 12.8 G/DL (ref 12–15.9)
HOLD SPECIMEN: NORMAL
IMM GRANULOCYTES # BLD AUTO: 0.02 10*3/MM3 (ref 0–0.05)
IMM GRANULOCYTES NFR BLD AUTO: 0.2 % (ref 0–0.5)
LIPASE SERPL-CCNC: 28 U/L (ref 13–60)
LYMPHOCYTES # BLD AUTO: 2.52 10*3/MM3 (ref 0.7–3.1)
LYMPHOCYTES NFR BLD AUTO: 30.3 % (ref 19.6–45.3)
MCH RBC QN AUTO: 29.7 PG (ref 26.6–33)
MCHC RBC AUTO-ENTMCNC: 32.8 G/DL (ref 31.5–35.7)
MCV RBC AUTO: 90.5 FL (ref 79–97)
MONOCYTES # BLD AUTO: 0.3 10*3/MM3 (ref 0.1–0.9)
MONOCYTES NFR BLD AUTO: 3.6 % (ref 5–12)
NEUTROPHILS NFR BLD AUTO: 5.36 10*3/MM3 (ref 1.7–7)
NEUTROPHILS NFR BLD AUTO: 64.5 % (ref 42.7–76)
NRBC BLD AUTO-RTO: 0 /100 WBC (ref 0–0.2)
NT-PROBNP SERPL-MCNC: 58.1 PG/ML (ref 0–450)
PLATELET # BLD AUTO: 213 10*3/MM3 (ref 140–450)
PMV BLD AUTO: 11.1 FL (ref 6–12)
POTASSIUM SERPL-SCNC: 3.7 MMOL/L (ref 3.5–5.2)
PROT SERPL-MCNC: 7.5 G/DL (ref 6–8.5)
QT INTERVAL: 350 MS
QT INTERVAL: 404 MS
QTC INTERVAL: 448 MS
QTC INTERVAL: 451 MS
RBC # BLD AUTO: 4.31 10*6/MM3 (ref 3.77–5.28)
SODIUM SERPL-SCNC: 138 MMOL/L (ref 136–145)
TROPONIN T DELTA: NORMAL
TROPONIN T SERPL HS-MCNC: <6 NG/L
WBC NRBC COR # BLD: 8.32 10*3/MM3 (ref 3.4–10.8)
WHOLE BLOOD HOLD COAG: NORMAL
WHOLE BLOOD HOLD SPECIMEN: NORMAL

## 2023-10-20 PROCEDURE — 84484 ASSAY OF TROPONIN QUANT: CPT | Performed by: EMERGENCY MEDICINE

## 2023-10-20 PROCEDURE — 36415 COLL VENOUS BLD VENIPUNCTURE: CPT

## 2023-10-20 PROCEDURE — 93005 ELECTROCARDIOGRAM TRACING: CPT

## 2023-10-20 PROCEDURE — 71045 X-RAY EXAM CHEST 1 VIEW: CPT

## 2023-10-20 PROCEDURE — 83690 ASSAY OF LIPASE: CPT | Performed by: EMERGENCY MEDICINE

## 2023-10-20 PROCEDURE — 93005 ELECTROCARDIOGRAM TRACING: CPT | Performed by: EMERGENCY MEDICINE

## 2023-10-20 PROCEDURE — 80053 COMPREHEN METABOLIC PANEL: CPT | Performed by: EMERGENCY MEDICINE

## 2023-10-20 PROCEDURE — 99284 EMERGENCY DEPT VISIT MOD MDM: CPT

## 2023-10-20 PROCEDURE — 85025 COMPLETE CBC W/AUTO DIFF WBC: CPT | Performed by: EMERGENCY MEDICINE

## 2023-10-20 PROCEDURE — 83880 ASSAY OF NATRIURETIC PEPTIDE: CPT | Performed by: EMERGENCY MEDICINE

## 2023-10-20 RX ORDER — SODIUM CHLORIDE 0.9 % (FLUSH) 0.9 %
10 SYRINGE (ML) INJECTION AS NEEDED
Status: DISCONTINUED | OUTPATIENT
Start: 2023-10-20 | End: 2023-10-20 | Stop reason: HOSPADM

## 2023-10-20 RX ORDER — ASPIRIN 81 MG/1
324 TABLET, CHEWABLE ORAL ONCE
Status: COMPLETED | OUTPATIENT
Start: 2023-10-20 | End: 2023-10-20

## 2023-10-20 RX ADMIN — ASPIRIN 324 MG: 81 TABLET, CHEWABLE ORAL at 13:48

## 2023-10-20 NOTE — ED PROVIDER NOTES
Subjective  History of Present Illness:    Chief Complaint: Chest pain  History of Present Illness: 43-year-old female with a sudden onset of mid sternal chest pain approximately half an hour prior to arrival, while sitting on the couch.  She felt radiation of pain in her back, it lasted a short while, she came to the emergency department to be evaluated.  She denies any cardiac history, she is not a smoker, no recent travel, no leg swelling.  No pain at this time.  No previous cardiac work-up.  Onset: Sudden onset  Duration: 30 minutes prior to arrival  Exacerbating / Alleviating factors: Patient was sitting on the couch, nonexertional  Associated symptoms: She experienced pain in her back.      Nurses Notes reviewed and agree, including vitals, allergies, social history and prior medical history.     REVIEW OF SYSTEMS: All systems reviewed and not pertinent unless noted.    Review of Systems   Respiratory:  Positive for chest tightness.    Cardiovascular:  Positive for chest pain.   All other systems reviewed and are negative.      Past Medical History:   Diagnosis Date    Abnormal Pap smear of cervix     Back problem     Breast cyst age 16    one breast is smaller    Calculus of kidney h/o 2010 2016-passed 1 and still have 2-started QD water pill. She passed stone 11/25/19-brought in stone    GERD (gastroesophageal reflux disease)     Hx of degenerative disc disease     Ovarian cyst     ruptured 2013, small present on CT 7/2016    Papanicolaou smear 11/19/2018    reviewed 11/25/2019-negative    Screening breast examination     self admits       Allergies:    Eq mucus relief dm [dextromethorphan-guaifenesin], Shellfish-derived products, Cymbalta [duloxetine hcl], Tizanidine, and Steri-strip compound benzoin [benzoin compound]      Past Surgical History:   Procedure Laterality Date    CARPAL TUNNEL RELEASE Bilateral 2018    CHOLECYSTECTOMY      COLPOSCOPY      KIDNEY STONE SURGERY  2010    KIDNEY STONE SURGERY   "    LUMBAR LAMINECTOMY WITH FUSION N/A 2022    Procedure: LUMBAR FUSION DECOMPRESSON WITH PEDICLE SCREWS, EXTEND L4-S1 FUSION TO L3;  Surgeon: Ronald Cantu MD;  Location: [facility];  Service: Neurosurgery;  Laterality: N/A;    OTHER SURGICAL HISTORY  age 20    laser of cervix    SPINAL FUSION  2012    DDD    TENNIS ELBOW RELEASE  2021    WISDOM TOOTH EXTRACTION               Social History     Socioeconomic History    Marital status: Significant Other   Tobacco Use    Smoking status: Former     Packs/day: .5     Types: Cigarettes     Quit date:      Years since quittin.8    Smokeless tobacco: Never    Tobacco comments:     Currently trying to quit, 2021   Vaping Use    Vaping Use: Never used   Substance and Sexual Activity    Alcohol use: Yes     Comment: Rare, 1 or 2 drinks per week    Drug use: Never    Sexual activity: Defer     Birth control/protection: Patch         Family History   Problem Relation Age of Onset    Heart disease Father         heart attacks    Hypertension Father     Diabetes Brother     Hypertension Brother     Diabetes Maternal Grandmother     Diabetes Maternal Grandfather        Objective  Physical Exam:  /83   Pulse 92   Temp 98.3 °F (36.8 °C) (Oral)   Resp 16   Ht 170.2 cm (67\")   Wt 83.9 kg (185 lb)   LMP 2023 (Approximate)   SpO2 100%   BMI 28.98 kg/m²      Physical Exam  Vitals and nursing note reviewed.   Constitutional:       Appearance: She is well-developed.   Cardiovascular:      Rate and Rhythm: Normal rate and regular rhythm.   Pulmonary:      Effort: Pulmonary effort is normal.      Breath sounds: Normal breath sounds.   Abdominal:      General: Bowel sounds are normal.      Palpations: Abdomen is soft.   Musculoskeletal:         General: Normal range of motion.      Cervical back: Normal range of motion and neck supple.   Skin:     General: Skin is warm and dry.   Neurological:      Mental Status: She is alert and " oriented to person, place, and time.      Deep Tendon Reflexes: Reflexes are normal and symmetric.           Procedures    ED Course:    ED Course as of 10/22/23 1257   Fri Oct 20, 2023   1559 HEART Score for Major Cardiac Events - MDCalc  Calculated on Oct 20 2023 3:59 PM  0 points -> Low Score (0-3 points) Risk of MACE of 0.9-1.7%. [CS]   1820 Updated the patient on both sets of troponin, with recommendation of follow-up in the cardiology clinic, she is low risk, her heart score is 0. [CS]      ED Course User Index  [CS] Chencho Lazar Jr., PA-C       Lab Results (last 24 hours)       ** No results found for the last 24 hours. **             XR Chest 1 View    Result Date: 10/20/2023  XR CHEST 1 VW Date of Exam: 10/20/2023 1:59 PM EDT Indication: Chest Pain Triage Protocol Comparison: 10/7/2022 Findings: Heart and pulmonary vessels and mediastinal contours appear within normal limits. Lung fields are clear. There are no effusions.     Impression: Impression: Negative. Electronically Signed: Candace Norton MD  10/20/2023 2:16 PM EDT  Workstation ID: [id number]        Medical Decision Making  Patient Presentation 43-year-old female presenting with chest pain that presented earlier in the day, sharp in nature, resolved spontaneous, on my evaluation, she is pain-free and vital signs are stable    DDX. Differential would include acute MI, chest wall pain, reflux disease, panic disorder anxiety, pericarditis, pneumonia, heart failure, PE, acute dissection.       Data Review/ Non ED Records ?Analysis/Ordering unique tests reviewed and summarized presented records, labs, radiology reports.  A CBC, CMP, troponin, lipase, BMP    Independent Review Studies and interpreted all labs including CBC, CMP, troponin, lipase, BMP    Intervention/Re-evaluation no acute intervention, reevaluation she remained chest pain-free    Independent Clinician discussed with my supervising physician Dr. Rothman    Risk Stratification  tools/clinical decision rules heart score 0, minimal risk, 2 negative troponins, no pain while in the ED, she did have palpitations this could be related to abnormal rhythm, she follows in the category of close follow-up as an outpatient, and she was given information for the chest pain clinic.    Shared Decision Making discussed this with the patient and family they agree with the plan    Disposition patient stable for discharge    Problems Addressed:  Chest pain, unspecified type: complicated acute illness or injury  Palpitations: complicated acute illness or injury    Amount and/or Complexity of Data Reviewed  Labs: ordered.  Radiology: ordered.  ECG/medicine tests: ordered.    Risk  OTC drugs.  Prescription drug management.          Final diagnoses:   Chest pain, unspecified type   Palpitations          Chencho Lazar Jr., PA-C  10/22/23 1257

## 2023-10-24 ENCOUNTER — OFFICE VISIT (OUTPATIENT)
Dept: CARDIOLOGY | Facility: HOSPITAL | Age: 43
End: 2023-10-24
Payer: COMMERCIAL

## 2023-10-24 ENCOUNTER — HOSPITAL ENCOUNTER (OUTPATIENT)
Dept: CARDIOLOGY | Facility: HOSPITAL | Age: 43
Discharge: HOME | End: 2023-10-24
Payer: COMMERCIAL

## 2023-10-24 VITALS
HEART RATE: 83 BPM | RESPIRATION RATE: 18 BRPM | HEIGHT: 67 IN | TEMPERATURE: 97.8 F | WEIGHT: 187.4 LBS | SYSTOLIC BLOOD PRESSURE: 113 MMHG | DIASTOLIC BLOOD PRESSURE: 70 MMHG | BODY MASS INDEX: 29.41 KG/M2 | OXYGEN SATURATION: 98 %

## 2023-10-24 DIAGNOSIS — R55 NEAR SYNCOPE: ICD-10-CM

## 2023-10-24 DIAGNOSIS — R07.9 CHEST PAIN, UNSPECIFIED TYPE: Primary | ICD-10-CM

## 2023-10-24 DIAGNOSIS — R00.0 TACHYCARDIA: ICD-10-CM

## 2023-10-24 PROCEDURE — 99214 OFFICE O/P EST MOD 30 MIN: CPT | Performed by: NURSE PRACTITIONER

## 2023-10-24 PROCEDURE — 93246 EXT ECG>7D<15D RECORDING: CPT

## 2023-10-24 RX ORDER — NALTREXONE HYDROCHLORIDE AND BUPROPION HYDROCHLORIDE 8; 90 MG/1; MG/1
1 TABLET, EXTENDED RELEASE ORAL 2 TIMES DAILY
COMMUNITY
Start: 2023-07-12

## 2023-10-24 NOTE — PROGRESS NOTES
"[facility]  Heart and Valve Center    Chief Complaint  Chest Pain    Subjective    History of Present Illness {CC  Problem List  Visit  Diagnosis   Encounters  Notes  Medications  Labs  Result Review Imaging  Media :23}     Mica Jacobson is a 43 y.o. female with GERD, kidney stones, lumbar stenosis status post lumbar fusion who presents today for evaluation of chest pain at the request of .    Patient presented to the ED on 10/20 with a sudden onset of midsternal chest discomfort that occurred approximately half an hour prior to arrival while sitting on her couch.  She felt radiation of pain in her back. Reports associated tachycardia and heart rates went up to 140. Felt pressure in her neck and jaw. Felt near syncopal. Lasted about 10 minutes. Denies any prior cardiac history or cardiac testing.  High sensitive troponins, BNP, EKGs all within normal limits.  Heart score 0.    Walks 5-6 miles a day in her job at the [employer] without exertional symptoms. No further chest pain, but feels like her chest is \"caving\" in at times. Denies shortness of breath or palpitations    Sister had tachycardia and had an ablation     Cardiac risks: family hx (father had multiple MIs starting in his 40s)    Objective     Vital Signs:   Vitals:    10/24/23 1503 10/24/23 1504 10/24/23 1505   BP: 122/75 112/75 113/70   BP Location: Right arm Left arm Left arm   Patient Position: Sitting Standing Sitting   Cuff Size: Adult Adult Adult   Pulse: 84 99 83   Resp:   18   Temp: 97.8 °F (36.6 °C) 97.8 °F (36.6 °C) 97.8 °F (36.6 °C)   TempSrc:   Temporal   SpO2: 98% 98% 98%   Weight:   85 kg (187 lb 6.4 oz)   Height:   170.2 cm (67\")     Body mass index is 29.35 kg/m².  Physical Exam  Vitals reviewed.   Constitutional:       Appearance: Normal appearance.   HENT:      Head: Normocephalic.   Neck:      Vascular: No carotid bruit.   Cardiovascular:      Rate and Rhythm: Normal rate and regular " rhythm.      Pulses: Normal pulses.      Heart sounds: Normal heart sounds, S1 normal and S2 normal. No murmur heard.  Pulmonary:      Effort: Pulmonary effort is normal. No respiratory distress.      Breath sounds: Normal breath sounds.   Chest:      Chest wall: No tenderness.   Abdominal:      General: Abdomen is flat.      Palpations: Abdomen is soft.   Musculoskeletal:      Cervical back: Neck supple.      Right lower leg: No edema.      Left lower leg: No edema.   Skin:     General: Skin is warm and dry.   Neurological:      General: No focal deficit present.      Mental Status: She is alert and oriented to person, place, and time. Mental status is at baseline.   Psychiatric:         Mood and Affect: Mood normal.         Behavior: Behavior normal.         Thought Content: Thought content normal.              Result Review  Data Reviewed:{ Labs  Result Review  Imaging  Med Tab  Media :23}     Comprehensive Metabolic Panel (10/20/2023 13:47)  High Sensitivity Troponin T 2Hr (10/20/2023 17:37)  BNP (10/20/2023 13:47)  Lipase (10/20/2023 13:47)  CBC & Differential (10/20/2023 13:47)  High Sensitivity Troponin T (10/20/2023 13:47)  ECG 12 Lead ED Triage Standing Order; Chest Pain (10/20/2023 16:15)  ECG 12 Lead ED Triage Standing Order; Chest Pain (10/20/2023 13:36)         Assessment and Plan {CC Problem List  Visit Diagnosis  ROS  Review (Popup)  Health Maintenance  Quality  BestPractice  Medications  SmartSets  SnapShot Encounters  Media :23}   1. Chest pain, unspecified type  - Unclear etiology but reassuring ED evaluation. Due to family hx will go ahead and do GXT  - Adult Transthoracic Echo Complete W/ Cont if Necessary Per Protocol; Future  - Treadmill Stress Test; Future    2. Tachycardia    - Holter Monitor - 72 Hour Up To 15 Days; Future  - Adult Transthoracic Echo Complete W/ Cont if Necessary Per Protocol; Future  - Treadmill Stress Test; Future    3. Near syncope    - Holter Monitor - 72  Hour Up To 15 Days; Future  - Adult Transthoracic Echo Complete W/ Cont if Necessary Per Protocol; Future  - Treadmill Stress Test; Future        Follow Up {Instructions Charge Capture  Follow-up Communications :23}   Return in about 5 weeks (around 11/28/2023) for Video Visit, Monitor results.    Patient was given instructions and counseling regarding her condition or for health maintenance advice. Please see specific information pulled into the AVS if appropriate.  Advised to call the Heart and Valve Center with any questions, concerns, or worsening symptoms.

## 2023-10-24 NOTE — PROGRESS NOTES
[facility] Heart Monitor Documentation    Mica Jacobson  1980  [id number]  10/24/23      [] ZIO XT Patch  Model [id number] Prescribed for  Days    Serial Number: (N + 9 Digits) N   Apply-By Date on Box:   USPS Tracking Number:   USPS Tracking        [] [employer] MINI PLUS Mobile Cardiac Telemetry  Model BGMINIPLUS Prescribed for  Days    Serial Number: (BGM + 7 Digits) BGM  Shipped-By Date on Box:   UPS Tracking Number: [id number]  UPS Tracking      [] [employer] MINI Holter Monitor  Model BGMINIEL Prescribed for 14 Days    Serial Number: (7 Digits) [id number]  Shipped-By Date on Box: 10/12/23  UPS Tracking Number: [id number]  UPS Tracking        This monitor was applied to the patient's chest and checked for proper functioning.  Ms. Mica Jacobson was instructed in the proper use of this monitor.  She was given the opportunity to ask questions and left the office with the device 's instruction manual.    Melissa Aguilar MA, 15:34 EDT, 10/24/23                  [facility] 8[facility]

## 2023-10-27 ENCOUNTER — HOSPITAL ENCOUNTER (OUTPATIENT)
Dept: CARDIOLOGY | Facility: HOSPITAL | Age: 43
Discharge: HOME | End: 2023-10-27
Payer: COMMERCIAL

## 2023-10-27 DIAGNOSIS — R07.9 CHEST PAIN, UNSPECIFIED TYPE: ICD-10-CM

## 2023-10-27 DIAGNOSIS — R55 NEAR SYNCOPE: ICD-10-CM

## 2023-10-27 DIAGNOSIS — R00.0 TACHYCARDIA: ICD-10-CM

## 2023-10-27 LAB
BH CV STRESS BP STAGE 1: NORMAL
BH CV STRESS BP STAGE 2: NORMAL
BH CV STRESS BP STAGE 3: NORMAL
BH CV STRESS DURATION MIN STAGE 1: 3
BH CV STRESS DURATION MIN STAGE 2: 3
BH CV STRESS DURATION MIN STAGE 3: 3
BH CV STRESS DURATION SEC STAGE 1: 0
BH CV STRESS DURATION SEC STAGE 2: 0
BH CV STRESS DURATION SEC STAGE 3: 0
BH CV STRESS GRADE STAGE 1: 10
BH CV STRESS GRADE STAGE 2: 12
BH CV STRESS GRADE STAGE 3: 14
BH CV STRESS HR STAGE 1: 125
BH CV STRESS HR STAGE 2: 151
BH CV STRESS HR STAGE 3: 173
BH CV STRESS METS STAGE 1: 5
BH CV STRESS METS STAGE 2: 7.5
BH CV STRESS METS STAGE 3: 10
BH CV STRESS O2 STAGE 1: 98
BH CV STRESS O2 STAGE 2: 100
BH CV STRESS O2 STAGE 3: 100
BH CV STRESS PROTOCOL 1: NORMAL
BH CV STRESS RECOVERY BP: NORMAL MMHG
BH CV STRESS RECOVERY HR: 106 BPM
BH CV STRESS RECOVERY O2: 99 %
BH CV STRESS SPEED STAGE 1: 1.7
BH CV STRESS SPEED STAGE 2: 2.5
BH CV STRESS SPEED STAGE 3: 3.4
BH CV STRESS STAGE 1: 1
BH CV STRESS STAGE 2: 2
BH CV STRESS STAGE 3: 3
MAXIMAL PREDICTED HEART RATE: 178 BPM
PERCENT MAX PREDICTED HR: 98.88 %
STRESS BASELINE BP: NORMAL MMHG
STRESS BASELINE HR: 88 BPM
STRESS O2 SAT REST: 99 %
STRESS PERCENT HR: 116 %
STRESS POST ESTIMATED WORKLOAD: 10.1 METS
STRESS POST EXERCISE DUR MIN: 9 MIN
STRESS POST EXERCISE DUR SEC: 0 SEC
STRESS POST O2 SAT PEAK: 100 %
STRESS POST PEAK BP: NORMAL MMHG
STRESS POST PEAK HR: 176 BPM
STRESS TARGET HR: 151 BPM

## 2023-10-27 PROCEDURE — 93017 CV STRESS TEST TRACING ONLY: CPT

## 2023-10-30 NOTE — PROGRESS NOTES
Your stress test was low risk and showed no evidence of ischemia/significant heart blockage. Please let me know if you have any questions or concerns.

## 2023-11-02 LAB
QT INTERVAL: 350 MS
QT INTERVAL: 404 MS
QTC INTERVAL: 448 MS
QTC INTERVAL: 451 MS

## 2023-11-16 ENCOUNTER — OFFICE VISIT (OUTPATIENT)
Dept: PAIN MEDICINE | Facility: CLINIC | Age: 43
End: 2023-11-16
Payer: COMMERCIAL

## 2023-11-16 VITALS — WEIGHT: 187.4 LBS | HEIGHT: 67 IN | BODY MASS INDEX: 29.41 KG/M2

## 2023-11-16 DIAGNOSIS — M96.1 POSTLAMINECTOMY SYNDROME OF LUMBAR REGION: ICD-10-CM

## 2023-11-16 DIAGNOSIS — Z98.1 HISTORY OF FUSION OF LUMBAR SPINE: ICD-10-CM

## 2023-11-16 DIAGNOSIS — M47.816 SPONDYLOSIS OF LUMBAR REGION WITHOUT MYELOPATHY OR RADICULOPATHY: ICD-10-CM

## 2023-11-16 DIAGNOSIS — E66.9 MILD OBESITY: ICD-10-CM

## 2023-11-16 DIAGNOSIS — M48.062 LUMBAR STENOSIS WITH NEUROGENIC CLAUDICATION: ICD-10-CM

## 2023-11-16 PROCEDURE — 99214 OFFICE O/P EST MOD 30 MIN: CPT | Performed by: NURSE PRACTITIONER

## 2023-11-16 NOTE — PROGRESS NOTES
"Chief Complaint: \"I still have pain in my back and legs\"        History of Present Illness:   Patient: Ms. Mica Jacobson, 43 y.o. female originally referred by Dr. Cantu in consultation for chronic intractable lower back and lower extremity pain.  Patient has a history of L4-S1 posterior lumbar interbody fusion on 8/27/2012 with excellent outcome with Dr. Ronald Cantu.  I last saw her in 2021, after undergoing a diagnostic and therapeutic bilateral L3-L4 transforaminal epidural steroid injection, from which she reported to me she experienced no significant pain relief.  She was then continued to struggle with severe elements of intolerance to standing and walking associated with radicular pain.  It was then felt, due to failure of interventional pain management measures she was to follow-up with Dr. Cantu.  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  She has had a long slow postoperative recovery.  She last followed up with neurosurgery on May 3, 2023, at that point, she was no longer experiencing bilateral lower extremity symptoms, but did continue with discomfort in the lower back, it was felt her symptoms were primarily mechanical.  She then contacted the neurosurgical practice and requested a referral to pain management.  Today she tells me, she occasionally will experience shooting pains into her lower extremities, but this is quite intermittent.  She does harbor quite a bit of lower back discomfort without significant radiation.  Patient has failed to obtain pain relief with conservative measures for more than including oral analgesics, physical therapy, ongoing home exercise program, to name a few.  She denies any advanced imaging since her surgery.  Pain Description: Constant pain with intermittent exacerbation, described as aching, shooting and throbbing sensation.   Radiation of Pain: The pain " Health Maintenance Due   Topic Date Due   • Depression Screening  08/30/1983   • Pneumococcal Vaccine 19-64 Highest Risk (2 of 3 - PCV13) 05/03/2012   • Influenza Vaccine (1) 08/01/2018      radiates from the lumbar region into her buttocks, the medial aspect of her thighs, and down the lateral aspect of her thighs, and calves  Pain intensity today: 0/10  Average pain intensity last week: 2/10  Pain intensity ranges from: 0/10 to 6/10  Aggravating factors: Pain increases with bending, protracted sitting, transitioning from sitting to standing, standing.     Alleviating factors: Pain decreases with lying down, walking   Associated Symptoms:   Patient denies pain, numbness or weakness in the lower extremities.   Patient denies any new bladder or bowel problems  Patient reports difficulties with her balance but denies falls  Pain interferes with regular activities, ADLs, and affects patient's quality of life  Pain interferes with general activities (ability to walk, stand, transition from different positions), perform activities of daily living  Pain does not interferes with sleep causing sleep fragmentation   Muscle spasms  Stiffness      Review of previous therapies and additional medical records:  Mica Jacobson has already failed the following measures, including:   Conservative Measures: Oral analgesics, opioids, topical analgesics, ice, heat, TENs, physical therapy   Interventional Measures: 11/01/2021: DxTx  bilateral L3-L4 transforaminal epidural steroid injections  Surgical Measures: History of L4-S1 posterior lumbar interbody fusion by Dr. Ronald Cantu in 8/2012.  Extension of fusion to L3-L4 with Dr. Cantu on 2/28/2022.  Mica Jacobson underwent neurosurgical consultation with Yeny Madrigal PA-C, on 05/03/2023 and was found not to be a surgical candidate at this time.  Mica Jacobson presents with significant comorbidities including GERD, kidney stones, mild obesity, current every day smoker  In terms of current analgesics, Mica Jacobson takes: ibuprofen, Flexeril, meloxicam.  She has failed trials with gabapentin and Cymbalta due to side effects.  I have reviewed [facility]  Report is consistent with medication reconciliation.  SOAPP: Not completed by the patient    Global Pain Scale 10-14  2021 12-09 2021 11-16 2023        Pain 15 15 6        Feelings 5 0 0        Clinical outcomes 18 20 1        Activities 5 8 0        GPS Total: 43 43 7          The [place] Back Pain Disability Scale  DATE 11-16 2023                 Sleep through the night 2                 Turn over in bed 2                 Get out of bed 2                 Make your bed 0                 Put on socks (pantyhose) 1                 Ride in a car 2                 Sit in a chair for several hours 4                 Stand up for 20-30 minutes 3                 Climb one flight of stairs 1                 Walk a few blocks (200-300 yards)  0                 Walk several miles 0                 Run one block (about 50 yards) 2                 Take food out of the refrigerator 0                 Reach up to high shelves 0                 Move a chair 0                 Pull or push heavy doors 0                 Bend over to clean the bathtub 3                 Throw a ball 0                 Carry two bags of groceries 2                 Lift and carry a heavy suitcase 3                 Total score 30                       Review of Diagnostic Studies:   Lumbar spine x-rays with flexion-extension views 7/12/2023: Postoperative change from prior L3-S1 fusion with pedicle screws at L3-L4.  No evidence of hardware malfunction.  Alignment is maintained.  Thoracic spine x-ray 7/12/2023: Minimal evidence of spondylosis  CT scan of the lumbar spine without contrast on 09/03/2021: Imaging was reviewed.  Evidence of prior L4-S1 posterior lumbar fusion without evidence of complication of the hardware.  Alignment is anatomic.  Multilevel spondylosis particularly at the adjacent level of L3-L4.  MRI of the lumbar spine without contrast on 06/29/2021: Imaging was reviewed.  Vertebral body heights are maintained.  Prior fusion L4 L5-S1.   Alignment is maintained.   L2-L3: Facet arthropathy.  No significant canal or foraminal stenosis  L3-L4: Disc bulge, facet arthropathy with moderate to severe spinal canal stenosis and moderate bilateral neuroforaminal stenosis  L4-L5 and L5-S1: S/p previous decompression and fusion without evidence of canal or foraminal stenosis  Flexion and extension x-rays of the lumbar spine on September 3, 2021.  Grade 1 listhesis of L2 on L3 and L3-L4 without abnormal motion.  Fusion hardware seen from L4-S1    Review of Systems   Musculoskeletal:  Positive for arthralgias and back pain.   All other systems reviewed and are negative.        Patient Active Problem List   Diagnosis    Kidney stone    Lumbar stenosis with neurogenic claudication    S/P lumbar fusion       Past Medical History:   Diagnosis Date    Abnormal Pap smear of cervix     Back problem     Breast cyst age 16    one breast is smaller    Calculus of kidney h/o 2010 2016-passed 1 and still have 2-started QD water pill. She passed stone 11/25/19-brought in stone    Chest pain     GERD (gastroesophageal reflux disease)     Hx of degenerative disc disease     Ovarian cyst     ruptured 2013, small present on CT 7/2016    Papanicolaou smear 11/19/2018    reviewed 11/25/2019-negative    Screening breast examination     self admits        Past Surgical History:   Procedure Laterality Date    CARPAL TUNNEL RELEASE Bilateral 2018    CHOLECYSTECTOMY      COLPOSCOPY      KIDNEY STONE SURGERY  2010    KIDNEY STONE SURGERY  2022    LUMBAR LAMINECTOMY WITH FUSION N/A 02/28/2022    Procedure: LUMBAR FUSION DECOMPRESSON WITH PEDICLE SCREWS, EXTEND L4-S1 FUSION TO L3;  Surgeon: Ronald Cantu MD;  Location: [facility];  Service: Neurosurgery;  Laterality: N/A;    OTHER SURGICAL HISTORY  age 20    laser of cervix    SPINAL FUSION  08/27/2012    DDD    TENNIS ELBOW RELEASE  11/25/2021    WISDOM TOOTH EXTRACTION      2017         Family History   Problem Relation Age of  Onset    No Known Problems Mother     Heart disease Father         heart attacks    Hypertension Father     Heart disease Sister     Supraventricular tachycardia Sister     Diabetes Brother     Hypertension Brother     Heart disease Maternal Grandmother     Diabetes Maternal Grandmother     Diabetes Maternal Grandfather          Social History     Socioeconomic History    Marital status: Significant Other   Tobacco Use    Smoking status: Never     Passive exposure: Past    Smokeless tobacco: Former     Types: Chew     Quit date: 2022    Tobacco comments:     Use to use for about 15 yrs   Vaping Use    Vaping Use: Never used   Substance and Sexual Activity    Alcohol use: Yes     Alcohol/week: 1.0 standard drink of alcohol     Types: 1 Glasses of wine per week     Comment: 1 monthly    Drug use: Never    Sexual activity: Defer     Birth control/protection: Patch           Current Outpatient Medications:     acetaminophen (TYLENOL) 325 MG tablet, Take  by mouth Every 6 (Six) Hours As Needed., Disp: , Rfl:     Contrave 8-90 MG tablet, Take 1 tablet by mouth 2 (Two) Times a Day., Disp: , Rfl:     cyclobenzaprine (FLEXERIL) 5 MG tablet, Every 8 (Eight) Hours., Disp: , Rfl:     hydroCHLOROthiazide (HYDRODIURIL) 25 MG tablet, Take 1 tablet by mouth Daily., Disp: , Rfl:     ibuprofen (ADVIL,MOTRIN) 800 MG tablet, TAKE ONE TABLET BY MOUTH EVERY 8 HOURS AS NEEDED FOR MILD PAIN, Disp: 90 tablet, Rfl: 0    meloxicam (MOBIC) 7.5 MG tablet, , Disp: , Rfl:     methocarbamol (ROBAXIN) 750 MG tablet, Take 1 tablet by mouth 3 (Three) Times a Day As Needed for Muscle Spasms., Disp: 60 tablet, Rfl: 2    Xulane 150-35 MCG/24HR, Place 1 patch on the skin as directed by provider 1 (One) Time Per Week. (Patient not taking: Reported on 11/16/2023), Disp: 3 patch, Rfl: 12      Allergies   Allergen Reactions    Eq Mucus Relief Dm [Dextromethorphan-Guaifenesin] Swelling     Throat swelling    Shellfish-Derived Products Anaphylaxis and Hives     "Cymbalta [Duloxetine Hcl] Dizziness     Dry mouth weakness, headache, nausea, and drowiness    Tizanidine Other (See Comments)     Pt states severe dizziness, drowsiness, weakness, severe dry mouth, headache, nausea     Steri-Strip Compound Benzoin [Benzoin Compound] Rash         Ht 170.2 cm (67\")   Wt 85 kg (187 lb 6.4 oz)   LMP 09/29/2023 (Approximate)   BMI 29.35 kg/m²       Physical Exam:  Constitutional: Patient appears well-developed, well-nourished, well-hydrated, appears younger than stated age  HEENT: Head: Normocephalic and atraumatic  Eyes: Conjunctivae and lids are normal  Pupils: Equal, round, reactive to light  Neck: Trachea normal. Neck supple. No JVD present.   Peripheral vascular exam: Posterior tibialis: right 2+ and left 2+. Dorsalis pedis: right 2+ and left 2+. No edema.   Musculoskeletal   Gait and station: Gait evaluation demonstrated a normal gait.   Lumbar spine: Passive and active range of motion are almost full and without pain.  Extension, flexion, and rotation of the lumbar spine did not increase or reproduce pain.  Lumbar facet joint loading maneuvers are negative.  Reggie test and Gaenslen's test are negative   Piriformis maneuvers are negative   Palpation of the bilateral greater trochanter, unrevealing   Examination of the Iliotibial band: unrevealing   Hip joints: The range of motion of the hip joints is limited to internal and external rotation, symmetrical, without pain  Neurological:   Patient is alert and oriented to person, place, and time.   Speech: Normal.   Cortical function: Normal mental status.   Cranial nerves 2-12: intact.   Reflex Scores:  Right patellar: 1+  Left patellar: 1+  Right Achilles: 1+  Left Achilles: 1+  Motor strength: 5/5  Motor Tone: Normal .   Involuntary movements: None.   Superficial/Primitive Reflexes: Primitive reflexes were absent.   Right Camarena: Absent  Left C[place]rena: Absent  Right ankle clonus: Absent  Left ankle clonus: Absent   Babinsky: " Absent  Long tract signs: Negative. Straight leg raising test: Negative. Femoral stretch sign: Negative.   Sensory exam: Intact to light touch, intact pain and temperature sensation, intact vibration sensation and normal proprioception.   Coordination: Normal finger to nose and heel to shin. Normal balance and negative Romberg's sign   Skin and subcutaneous tissue: Skin is warm and intact. No rash noted. No cyanosis.   Psychiatric: Judgment and insight: Normal. Recent and remote memory: Intact. Mood and affect: Normal.           ASSESSMENT:   1. Postlaminectomy syndrome of lumbar region    2. History of fusion of lumbar spine    3. Spondylosis of lumbar region without myelopathy or radiculopathy    4. Lumbar stenosis with neurogenic claudication    5. Mild obesity          PLAN/MEDICAL DECISION MAKING:  Ms. Mica Jacobson, 43 y.o. female has a history of L4-S1 posterior lumbar interbody fusion on 8/27/2012 with excellent outcome with Dr. Ronald Cantu.   I last saw her in 2021, after undergoing a diagnostic and therapeutic bilateral L3-L4 transforaminal epidural steroid injection, from which she reported to me she experienced no significant pain relief.  She was then continued to struggle with severe elements of intolerance to standing and walking associated with radicular pain.  It was then felt, due to failure of interventional pain management measures she was to follow-up with Dr. Cantu.  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  She has had a long slow postoperative recovery.  She last followed up with neurosurgery on May 3, 2023, at that point, she was no longer experiencing bilateral lower extremity symptoms, but did continue with discomfort in the lower back, it was felt her symptoms were primarily mechanical.  She then contacted the neurosurgical practice and requested a referral to pain management.   Today she tells me, she occasionally will experience shooting pains into her lower extremities, but this is quite intermittent.  She does harbor quite a bit of lower back discomfort without significant radiation.  Her last preoperative MRI of the lumbar spine on 06/20/2021 revealed successful decompression and fusion L4-S1. Moderate to severe canal and foraminal stenosis at L3-L4, at the adjacent level of her lumbar fusion.  CT of the lumbar spine confirm a solid fusion L4-S1 without evidence of hardware complication.  Flexion and extension x-rays of the lumbar spine reviewed the solid fusion. No evidence of significant instability.  She denies having any updated advanced imaging.  Due to patient's ongoing lower back symptoms, I have discussed with her the possibility of a spinal cord stimulator trial.  Her physical examination was quite benign, I did not find any positive lumbar facet maneuvers.  Her pain is not quite significant on some days.  Although on some days she can go harbor quite severe pain.  I recommended we move forward with updated imaging, as well as thoracic spine imaging in the case of a spinal cord stimulator placement.  Patient has failed to obtain pain relief with conservative measures for more than including oral analgesics, physical therapy, ongoing home exercise program, surgical intervention, previous interventional pain management measures, to name a few. I have reviewed all available patient's medical records as well as previous therapies as referenced above. I had a lengthy conversation with Ms. Mica Jacobson regarding her chronic pain condition and potential therapeutic options including risks, benefits, alternative therapies, to name a few. Therefore, I have proposed the following plan:  1. Pharmacological measures: Reviewed and discussed; Patient takes ibuprofen, Flexeril, meloxicam. Patient has declined additional pharmacological measures.   2. Interventional pain management  measures: None indicated this time.  I have discussed with her the possibility of a spinal cord stimulator trial.  Patient has received formal education from me including risks, benefits, and alternative treatments, as well as detailed information regarding the procedure and specific goals for the trial. Patient has also received didactic materials including educational booklets and DVDs on spinal cord stimulation therapies.  Other options would include, the possibility of a caudal epidural steroid injection.  Patient is going to follow-up with Dr. Cantu.  2.  Diagnostics:  A.  MRI of the lumbar spine with and without contrast (patient has not had any advanced imaging since surgery)  B.  MRI of the thoracic spine without contrast to assess patency of epidural space prior to SCS lead placement.  C.  Bilateral hip x-rays  3. Long-term rehabilitation efforts:  A. The patient does not have a history of falls. I did complete a risk assessment for falls  B.  Patient will start a comprehensive physical therapy program for core strengthening, gait and balance training and neurodynamics   C. Start an exercise program such as yoga, Pilates, and swimming  D. Contrast therapy: Apply ice-packs for 15-20 minutes, followed by heating pads for 15-20 minutes to affected area   E.  Continue efforts at weight loss  F.  In the case of advanced pain therapies she will need a referral to Dr. Matheus Borges for psychological clearance of spinal cord stimulation therapies.  4. The patient has been instructed to contact my office with any questions or difficulties. The patient understands the plan and agrees to proceed accordingly.    Mica Jacobson reports a pain score of 0.  Given her pain assessment as noted, treatment options were discussed and the following options were decided upon as a follow-up plan to address the patient's pain: continuation of current treatment plan for pain, home exercises and therapy, referral to Primary  Care for assistance in pain treatment guidance, use of non-medical modalities (ice, heat, stretching and/or behavior modifications), and diagnostic work-up .      Pain Medications               acetaminophen (TYLENOL) 325 MG tablet Take  by mouth Every 6 (Six) Hours As Needed.    cyclobenzaprine (FLEXERIL) 5 MG tablet Every 8 (Eight) Hours.    ibuprofen (ADVIL,MOTRIN) 800 MG tablet TAKE ONE TABLET BY MOUTH EVERY 8 HOURS AS NEEDED FOR MILD PAIN    meloxicam (MOBIC) 7.5 MG tablet     methocarbamol (ROBAXIN) 750 MG tablet Take 1 tablet by mouth 3 (Three) Times a Day As Needed for Muscle Spasms.              Patient Care Team:  Daphne Urena MD as PCP - General (Internal Medicine & Pediatrics)     No orders of the defined types were placed in this encounter.        Future Appointments   Date Time Provider Department Center   11/22/2023  2:00 PM [facility] ECHO CART RM 4 [facility]  [facility]   11/29/2023 10:30 AM Amelia Harrison APRN [facility]         KHANH Hdez

## 2023-11-22 ENCOUNTER — HOSPITAL ENCOUNTER (OUTPATIENT)
Dept: CARDIOLOGY | Facility: HOSPITAL | Age: 43
Discharge: HOME | End: 2023-11-22
Admitting: NURSE PRACTITIONER
Payer: COMMERCIAL

## 2023-11-22 DIAGNOSIS — R00.0 TACHYCARDIA: ICD-10-CM

## 2023-11-22 DIAGNOSIS — R55 NEAR SYNCOPE: ICD-10-CM

## 2023-11-22 DIAGNOSIS — R07.9 CHEST PAIN, UNSPECIFIED TYPE: ICD-10-CM

## 2023-11-22 LAB
BH CV ECHO MEAS - AO MAX PG: 8 MMHG
BH CV ECHO MEAS - AO MEAN PG: 4 MMHG
BH CV ECHO MEAS - AO ROOT DIAM: 2.8 CM
BH CV ECHO MEAS - AO V2 MAX: 141 CM/SEC
BH CV ECHO MEAS - AO V2 VTI: 28 CM
BH CV ECHO MEAS - AVA(I,D): 1.76 CM2
BH CV ECHO MEAS - EDV(CUBED): 85.2 ML
BH CV ECHO MEAS - EDV(MOD-SP2): 181 ML
BH CV ECHO MEAS - EDV(MOD-SP4): 143 ML
BH CV ECHO MEAS - EF(MOD-BP): 57.3 %
BH CV ECHO MEAS - EF(MOD-SP2): 62.1 %
BH CV ECHO MEAS - EF(MOD-SP4): 51.7 %
BH CV ECHO MEAS - ESV(CUBED): 16.2 ML
BH CV ECHO MEAS - ESV(MOD-SP2): 68.6 ML
BH CV ECHO MEAS - ESV(MOD-SP4): 69.1 ML
BH CV ECHO MEAS - FS: 42.5 %
BH CV ECHO MEAS - IVS/LVPW: 1.11 CM
BH CV ECHO MEAS - IVSD: 1 CM
BH CV ECHO MEAS - LA DIMENSION: 3.5 CM
BH CV ECHO MEAS - LAT PEAK E' VEL: 15.2 CM/SEC
BH CV ECHO MEAS - LV DIASTOLIC VOL/BSA (35-75): 72.8 CM2
BH CV ECHO MEAS - LV MASS(C)D: 137.8 GRAMS
BH CV ECHO MEAS - LV MAX PG: 3.7 MMHG
BH CV ECHO MEAS - LV MEAN PG: 2 MMHG
BH CV ECHO MEAS - LV SYSTOLIC VOL/BSA (12-30): 35.2 CM2
BH CV ECHO MEAS - LV V1 MAX: 96.3 CM/SEC
BH CV ECHO MEAS - LV V1 VTI: 18 CM
BH CV ECHO MEAS - LVIDD: 4.4 CM
BH CV ECHO MEAS - LVIDS: 2.5 CM
BH CV ECHO MEAS - LVOT AREA: 2.7 CM2
BH CV ECHO MEAS - LVOT DIAM: 1.87 CM
BH CV ECHO MEAS - LVPWD: 0.9 CM
BH CV ECHO MEAS - MED PEAK E' VEL: 12.3 CM/SEC
BH CV ECHO MEAS - MV A MAX VEL: 57.4 CM/SEC
BH CV ECHO MEAS - MV DEC SLOPE: 501.8 CM/SEC2
BH CV ECHO MEAS - MV E MAX VEL: 82.6 CM/SEC
BH CV ECHO MEAS - MV E/A: 1.44
BH CV ECHO MEAS - MV MAX PG: 4.5 MMHG
BH CV ECHO MEAS - MV MEAN PG: 1.81 MMHG
BH CV ECHO MEAS - MV P1/2T: 65 MSEC
BH CV ECHO MEAS - MV V2 VTI: 23.7 CM
BH CV ECHO MEAS - MVA(P1/2T): 3.4 CM2
BH CV ECHO MEAS - MVA(VTI): 2.09 CM2
BH CV ECHO MEAS - PA ACC TIME: 0.15 SEC
BH CV ECHO MEAS - SI(MOD-SP2): 57.2 ML/M2
BH CV ECHO MEAS - SI(MOD-SP4): 37.6 ML/M2
BH CV ECHO MEAS - SV(LVOT): 49.4 ML
BH CV ECHO MEAS - SV(MOD-SP2): 112.4 ML
BH CV ECHO MEAS - SV(MOD-SP4): 73.9 ML
BH CV ECHO MEAS - TAPSE (>1.6): 2.28 CM
BH CV ECHO MEAS - TR MAX PG: 17.5 MMHG
BH CV ECHO MEAS - TR MAX VEL: 209 CM/SEC
BH CV ECHO MEASUREMENTS AVERAGE E/E' RATIO: 6.01
BH CV XLRA - RV BASE: 3.3 CM
BH CV XLRA - RV LENGTH: 5.7 CM
BH CV XLRA - RV MID: 2.37 CM

## 2023-11-22 PROCEDURE — 93306 TTE W/DOPPLER COMPLETE: CPT

## 2023-11-26 NOTE — PROGRESS NOTES
Your echocardiogram is within normal limits.   Your heart squeezes normally.  There are no significant valvular abnormalities noted. Please let me know if you have any questions or concerns

## 2023-11-29 ENCOUNTER — TELEMEDICINE (OUTPATIENT)
Dept: CARDIOLOGY | Facility: HOSPITAL | Age: 43
End: 2023-11-29
Payer: COMMERCIAL

## 2023-11-29 VITALS — HEIGHT: 67 IN | BODY MASS INDEX: 28.72 KG/M2 | WEIGHT: 183 LBS

## 2023-11-29 DIAGNOSIS — R00.0 TACHYCARDIA: ICD-10-CM

## 2023-11-29 DIAGNOSIS — R07.89 CHEST PAIN, ATYPICAL: Primary | ICD-10-CM

## 2024-04-17 ENCOUNTER — TRANSCRIBE ORDERS (OUTPATIENT)
Dept: ADMINISTRATIVE | Facility: HOSPITAL | Age: 44
End: 2024-04-17
Payer: COMMERCIAL

## 2024-04-17 DIAGNOSIS — R10.9 FLANK PAIN: Primary | ICD-10-CM

## 2024-10-15 PROBLEM — J02.9 SORE THROAT: Status: ACTIVE | Noted: 2024-10-15

## 2024-10-16 ENCOUNTER — PATIENT ROUNDING (BHMG ONLY) (OUTPATIENT)
Dept: URGENT CARE | Facility: CLINIC | Age: 44
End: 2024-10-16

## 2024-12-03 ENCOUNTER — PATIENT ROUNDING (BHMG ONLY) (OUTPATIENT)
Dept: URGENT CARE | Facility: CLINIC | Age: 44
End: 2024-12-03
Payer: COMMERCIAL

## 2025-01-25 ENCOUNTER — TELEMEDICINE (OUTPATIENT)
Dept: FAMILY MEDICINE CLINIC | Facility: TELEHEALTH | Age: 45
End: 2025-01-25
Payer: COMMERCIAL

## 2025-01-25 DIAGNOSIS — J01.00 ACUTE MAXILLARY SINUSITIS, RECURRENCE NOT SPECIFIED: Primary | ICD-10-CM

## 2025-01-25 RX ORDER — ONDANSETRON 8 MG/1
8 TABLET, ORALLY DISINTEGRATING ORAL EVERY 8 HOURS PRN
COMMUNITY
Start: 2025-01-16

## 2025-01-25 RX ORDER — AZITHROMYCIN 250 MG/1
TABLET, FILM COATED ORAL
Qty: 6 TABLET | Refills: 0 | Status: SHIPPED | OUTPATIENT
Start: 2025-01-25

## 2025-01-25 RX ORDER — TAMSULOSIN HYDROCHLORIDE 0.4 MG/1
1 CAPSULE ORAL DAILY PRN
COMMUNITY
Start: 2025-01-16

## 2025-01-25 RX ORDER — HYDROCODONE BITARTRATE AND ACETAMINOPHEN 7.5; 325 MG/1; MG/1
1 TABLET ORAL EVERY 8 HOURS PRN
COMMUNITY
Start: 2025-01-16

## 2025-01-25 NOTE — PROGRESS NOTES
CHIEF COMPLAINT  Chief Complaint   Patient presents with    Sinusitis         HPI  Mica Jacobson is a 45 y.o. female  presents with complaint of sneezing and drainage in her throat that started on Monday. She facial pain and tenderness over her cheeks.   She gets sinus infections in the past and usually takes a Zpack.     Review of Systems   Constitutional:  Positive for fatigue. Negative for chills, diaphoresis and fever.   HENT:  Positive for congestion, postnasal drip, rhinorrhea, sinus pressure, sinus pain and sore throat. Negative for sneezing.    Respiratory:  Positive for cough. Negative for chest tightness, shortness of breath and wheezing.    Gastrointestinal:  Negative for diarrhea, nausea and vomiting.   Musculoskeletal:  Negative for myalgias.   Neurological:  Positive for headaches.       Past Medical History:   Diagnosis Date    Abnormal Pap smear of cervix     Back problem     Breast cyst age 16    one breast is smaller    Calculus of kidney h/o 2010 2016-passed 1 and still have 2-started QD water pill. She passed stone 11/25/19-brought in stone    Chest pain     GERD (gastroesophageal reflux disease)     Hx of degenerative disc disease     Ovarian cyst     ruptured 2013, small present on CT 7/2016    Papanicolaou smear 11/19/2018    reviewed 11/25/2019-negative    Screening breast examination     self admits       Family History   Problem Relation Age of Onset    No Known Problems Mother     Heart disease Father         heart attacks    Hypertension Father     Heart disease Sister     Supraventricular tachycardia Sister     Diabetes Brother     Hypertension Brother     Heart disease Maternal Grandmother     Diabetes Maternal Grandmother     Diabetes Maternal Grandfather        Social History     Socioeconomic History    Marital status:    Tobacco Use    Smoking status: Never     Passive exposure: Past    Smokeless tobacco: Former     Types: Chew     Quit date: 2022    Tobacco  comments:     Use to use for about 15 yrs   Vaping Use    Vaping status: Never Used   Substance and Sexual Activity    Alcohol use: Yes     Alcohol/week: 1.0 standard drink of alcohol     Types: 1 Glasses of wine per week     Comment: 1 monthly    Drug use: Never    Sexual activity: Defer     Birth control/protection: Patch         LMP 01/11/2025 (Exact Date)   Breastfeeding No     PHYSICAL EXAM  Physical Exam   Constitutional: She is oriented to person, place, and time. She appears well-developed and well-nourished. She does not have a sickly appearance. She does not appear ill. No distress.   HENT:   Head: Normocephalic and atraumatic.   Nose: Congestion present. Right sinus exhibits maxillary sinus tenderness. Right sinus exhibits no frontal sinus tenderness. Left sinus exhibits maxillary sinus tenderness. Left sinus exhibits no frontal sinus tenderness.   Eyes: EOM are normal.   Neck: Neck normal appearance.  Pulmonary/Chest: Effort normal.  No respiratory distress.  Neurological: She is alert and oriented to person, place, and time.   Skin: Skin is dry.   Psychiatric: She has a normal mood and affect.           Diagnoses and all orders for this visit:    1. Acute maxillary sinusitis, recurrence not specified (Primary)    Other orders  -     azithromycin (Zithromax Z-Terrance) 250 MG tablet; Take 2 tablets by mouth on day 1, then 1 tablet daily on days 2-5  Dispense: 6 tablet; Refill: 0        Mode of visit: Video   Myself and Mica Jacobson participated in this visit. The patient is located in [address] [place] [address]. I am located in [place], [place]. [email or web address] and i[employer] were utilized.   You have chosen to receive care through a telehealth visit.     Does the patient consent to use a video/audio connection for your medical care today? Yes       Note Disclaimer: At [facility], we believe that sharing information builds trust and better   relationships. You are receiving this note because you  recently visited [facility]. It is possible you   will see health information before a provider has talked with you about it. This kind of information can   be easy to misunderstand. To help you fully understand what it means for your health, we urge you to   discuss this note with your provider.    Katelynn Noel, KHANH  01/25/2025  07:51 EST

## (undated) DEVICE — ADHS LIQ MASTISOL 2/3ML

## (undated) DEVICE — SPHR MARKR STEALTH STATION

## (undated) DEVICE — SUT VIC PLS CTD ANTIB BR 3/0 8/18IN 45CM

## (undated) DEVICE — TRAP,MUCUS SPECIMEN,40CC: Brand: MEDLINE

## (undated) DEVICE — BLANKT WARM UPPR/BDY ARM/OUT 57X196CM

## (undated) DEVICE — PATIENT RETURN ELECTRODE, SINGLE-USE, CONTACT QUALITY MONITORING, ADULT, WITH 9FT CORD, FOR PATIENTS WEIGING OVER 33LBS. (15KG): Brand: MEGADYNE

## (undated) DEVICE — ANTIBACTERIAL UNDYED BRAIDED (POLYGLACTIN 910), SYNTHETIC ABSORBABLE SUTURE: Brand: COATED VICRYL

## (undated) DEVICE — PK NEURO DISC 10

## (undated) DEVICE — GLV SURG PREMIERPRO MIC LTX PF SZ7.5 BRN

## (undated) DEVICE — SUT ETHLN 3/0 FS1 30IN 669H

## (undated) DEVICE — STRAP POSTN KN/BDY FM 5X72IN DISP

## (undated) DEVICE — KT DRN EVAC WND PVC PCH WTROC RND 10F400

## (undated) DEVICE — ELECTRD BLD EZ CLN MOD 4IN

## (undated) DEVICE — GLV SURG PREMIERPRO MIC LTX PF SZ6.5 BRN

## (undated) DEVICE — PACK,UNIVERSAL,NO GOWNS: Brand: MEDLINE

## (undated) DEVICE — SNAP KOVER: Brand: UNBRANDED

## (undated) DEVICE — SUT VIC 0 CTD BR 18IN UNDYE VCP724D

## (undated) DEVICE — SHEET,DRAPE,40X58,STERILE: Brand: MEDLINE

## (undated) DEVICE — ACCY PA700 LUBRICANT DIFFUSER MR7 4 PACK: Brand: MIDAS REX

## (undated) DEVICE — Device

## (undated) DEVICE — DRAPE,TOP,102X53,STERILE: Brand: MEDLINE

## (undated) DEVICE — TOOL MR8-14MH30 MR8 14CM MATCH 3MM: Brand: MIDAS REX MR8

## (undated) DEVICE — TBG PENCL TELESCP MEGADYNE SMOKE EVAC 10FT

## (undated) DEVICE — GOWN,REINFORCE,POLY,SIRUS,BREATH SLV,XLG: Brand: MEDLINE

## (undated) DEVICE — 3M™ STERI-STRIP™ REINFORCED ADHESIVE SKIN CLOSURES, R1547, 1/2 IN X 4 IN (12 MM X 100 MM), 6 STRIPS/ENVELOPE: Brand: 3M™ STERI-STRIP™

## (undated) DEVICE — IRRIGATOR BULB ASEPTO 60CC STRL

## (undated) DEVICE — DRSNG WND GZ PAD BORDERED 4X8IN STRL

## (undated) DEVICE — 3M™ STERI-DRAPE™ INSTRUMENT POUCH 1018: Brand: STERI-DRAPE™